# Patient Record
Sex: FEMALE | Race: WHITE | NOT HISPANIC OR LATINO | Employment: OTHER | URBAN - METROPOLITAN AREA
[De-identification: names, ages, dates, MRNs, and addresses within clinical notes are randomized per-mention and may not be internally consistent; named-entity substitution may affect disease eponyms.]

---

## 2017-01-06 ENCOUNTER — TELEPHONE (OUTPATIENT)
Dept: AUDIOLOGY | Facility: CLINIC | Age: 67
End: 2017-01-06

## 2017-01-06 ENCOUNTER — CLINICAL SUPPORT (OUTPATIENT)
Dept: AUDIOLOGY | Facility: CLINIC | Age: 67
End: 2017-01-06
Payer: MEDICARE

## 2017-01-06 DIAGNOSIS — H90.3 HEARING LOSS, SENSORINEURAL, ASYMMETRICAL: Primary | ICD-10-CM

## 2017-01-06 NOTE — PROGRESS NOTES
Luci Farias was seen 01/06/2017 for a hearing aid consult to discuss hearing aids.     The following aids will be ordered:    An [employer] OPN 1 mini RITEs  Color: Lyle brown  Speaker Units:  1 R 85  Domes:  8mm bass single?      She is already scheduled to return for her fitting.

## 2017-01-06 NOTE — MR AVS SNAPSHOT
[facility]  [address] [place] [address]  Phone: [phone]  Fax: [phone]                  Laurennaeemsherri Farias   2017 8:00 AM   Appointment    Description:  Female : 1950   Provider:  [facility]   Department:  [facility]                To Do List           Future Appointments        Provider Department Dept Phone    2017 9:00 AM [facility] [phone]      Goals (5 Years of Data)     None      [facility]     [facility] -  Assistance  Registered nurses in the [facility] provide clinical advisement, health education, appointment booking, and other advisory services.  Call for this free service at [phone].             Medications           Message regarding Medications     Verify the changes and/or additions to your medication regime listed below are the same as discussed with your clinician today.  If any of these changes or additions are incorrect, please notify your healthcare provider.             Verify that the below list of medications is an accurate representation of the medications you are currently taking.  If none reported, the list may be blank. If incorrect, please contact your healthcare provider. Carry this list with you in case of emergency.           Current Medications     amlodipine (NORVASC) 5 MG tablet Take 5 mg by mouth once daily.    aspirin 81 MG Chew Take 81 mg by mouth once daily.    b complex vitamins capsule Take 1 capsule by mouth once daily.    benzonatate (TESSALON) 100 MG capsule Take 100 mg by mouth 3 (three) times daily as needed for Cough.    bumetanide (BUMEX) 2 MG tablet Take 2 mg by mouth once daily.    calcium-vitamin D3 (CALCIUM 500 + D) 500 mg(1,250mg) -200 unit per tablet Take 1 tablet by mouth 2 (two) times daily with meals.    carvedilol (COREG) 12.5 MG tablet Take 12.5 mg by mouth 2 (two) times daily with meals.     clopidogrel (PLAVIX) 75 mg tablet Take 75 mg by mouth once daily.    epoetin jose (PROCRIT) 3,000 unit/mL injection Inject 3,000 Units into the skin once.    ferrous sulfate 324 mg (65 mg iron) TbEC Take 325 mg by mouth once daily.    fluticasone (FLONASE) 50 mcg/actuation nasal spray 1 spray by Each Nare route once daily.    gabapentin (NEURONTIN) 300 MG capsule Take 300 mg by mouth 3 (three) times daily.    insulin glargine (LANTUS) 100 unit/mL injection Inject 12 Units into the skin every evening.    isosorbide-hydrALAZINE 20-37.5 mg (BIDIL) 20-37.5 mg Tab Take 1 tablet by mouth 3 (three) times daily.    lactulose (CEPHULAC) 10 gram packet Take 10 g by mouth 3 (three) times daily.    levothyroxine (SYNTHROID, LEVOTHROID) 175 MCG tablet Take 175 mcg by mouth once daily.    metOLazone (ZAROXOLYN) 5 MG tablet Take 5 mg by mouth once daily.    pseudoephedrine-guaifenesin  mg (MUCINEX D)  mg per tablet Take 2 tablets by mouth every 12 (twelve) hours.    rosuvastatin (CRESTOR) 20 MG tablet Take 20 mg by mouth once daily.    spironolactone (ALDACTONE) 25 MG tablet Take 25 mg by mouth once daily.           Clinical Reference Information           Allergies as of 1/6/2017     Codeine    Darvon [Propoxyphene]    Demerol [Meperidine]    Penicillins      Immunizations Administered on Date of Encounter - 1/6/2017     None      [facility] Sign-Up     Activating your [facility] account is as easy as 1-2-3!     1) Visit [email or web address], select Sign Up Now, enter this activation code and your date of birth, then select Next.  [email or web address]  Expires: 1/20/2017  1:10 PM      2) Create a username and password to use when you visit [facility] in the future and select a security question in case you lose your password and select Next.    3) Enter your e-mail address and click Sign Up!    Additional Information  If you have questions, please e-mail [email or web address] or call [phone] to talk to our [facility] staff.  Remember, [facility] is NOT to be used for urgent needs. For medical emergencies, dial 911.

## 2017-01-06 NOTE — TELEPHONE ENCOUNTER
----- Message from Tremaine Wood sent at 1/6/2017 10:07 AM CST -----  Contact: Radha ( [facility] )   Radha GODOY  ( [facility] ) is requesting a call from nurse to requesting last office notes of the pt visit. Radha ( [facility] ) states she believes the pt daughter doesn't want anyone to know the pt is in a nursing home but the nurse will need to get document and notes.         Please call  Radha Gutierrez ) back at [phone] or [phone]

## 2017-01-26 ENCOUNTER — CLINICAL SUPPORT (OUTPATIENT)
Dept: AUDIOLOGY | Facility: CLINIC | Age: 67
End: 2017-01-26
Payer: MEDICARE

## 2017-01-26 DIAGNOSIS — H90.3 HEARING LOSS, SENSORINEURAL, ASYMMETRICAL: Primary | ICD-10-CM

## 2017-01-26 NOTE — PROGRESS NOTES
Luci Farias was seen on 01/26/2017 for a hearing aid fitting.  Settings were set to prescribed level 2 based on current audiogram.  Patient was counseled on insertion, cleaning and maintenance. Patient was given a copy of the hearing aid purchase agreement and she already paid in full on 1/6/17.  Patient's one month trail period will begin today. Patient has been scheduled for a hearing aid follow up in two weeks.      :  [employer]  Model:  OPN 1 mini rite  Color:terracotta  Speaker link:1 (61)  Right aid sn#:[id number]  Left aid sn#:na  Dome size:6 mm bass double   Micromold sn#:na  Repair warranty;2/9/20  L/D warranty:2/9/20

## 2017-05-11 ENCOUNTER — CLINICAL SUPPORT (OUTPATIENT)
Dept: AUDIOLOGY | Facility: CLINIC | Age: 67
End: 2017-05-11
Payer: MEDICARE

## 2017-05-11 DIAGNOSIS — H90.3 HEARING LOSS, SENSORINEURAL, ASYMMETRICAL: Primary | ICD-10-CM

## 2017-05-11 NOTE — PROGRESS NOTES
Luci Farias was seen 05/11/2017 for a hearing aid follow-up appointment.  Aid dead, wax filter replaced and aid is now in good working condition. Aid put to step 3.  She was otherwise doing very well with her hearing aid.      Patient will call for any future hearing aid follow-up appointments as needed.

## 2017-06-12 ENCOUNTER — TELEPHONE (OUTPATIENT)
Dept: OTOLARYNGOLOGY | Facility: CLINIC | Age: 67
End: 2017-06-12

## 2017-06-12 NOTE — TELEPHONE ENCOUNTER
----- Message from Cecy Díaz sent at 6/12/2017  8:27 AM CDT -----  Contact: pt   Call pt regarding questions about hearing aids.   ..[phone] (fduz)

## 2017-06-27 ENCOUNTER — CLINICAL SUPPORT (OUTPATIENT)
Dept: AUDIOLOGY | Facility: CLINIC | Age: 67
End: 2017-06-27
Payer: MEDICARE

## 2017-06-27 ENCOUNTER — OFFICE VISIT (OUTPATIENT)
Dept: OTOLARYNGOLOGY | Facility: CLINIC | Age: 67
End: 2017-06-27
Payer: MEDICARE

## 2017-06-27 VITALS
TEMPERATURE: 94 F | HEART RATE: 60 BPM | SYSTOLIC BLOOD PRESSURE: 113 MMHG | DIASTOLIC BLOOD PRESSURE: 76 MMHG | WEIGHT: 156.06 LBS

## 2017-06-27 DIAGNOSIS — Z46.1 ENCOUNTER FOR FITTING AND ADJUSTMENT OF HEARING AID OF BOTH EARS: Primary | ICD-10-CM

## 2017-06-27 DIAGNOSIS — S01.311A LACERATION OF EAR CANAL, RIGHT, INITIAL ENCOUNTER: ICD-10-CM

## 2017-06-27 DIAGNOSIS — H90.3 SENSORINEURAL HEARING LOSS (SNHL) OF BOTH EARS: Primary | ICD-10-CM

## 2017-06-27 PROCEDURE — 1159F MED LIST DOCD IN RCRD: CPT | Mod: S$GLB,,, | Performed by: ORTHOPAEDIC SURGERY

## 2017-06-27 PROCEDURE — 99999 PR PBB SHADOW E&M-EST. PATIENT-LVL III: CPT | Mod: PBBFAC,,, | Performed by: ORTHOPAEDIC SURGERY

## 2017-06-27 PROCEDURE — 99213 OFFICE O/P EST LOW 20 MIN: CPT | Mod: S$GLB,,, | Performed by: ORTHOPAEDIC SURGERY

## 2017-06-27 PROCEDURE — 1125F AMNT PAIN NOTED PAIN PRSNT: CPT | Mod: S$GLB,,, | Performed by: ORTHOPAEDIC SURGERY

## 2017-06-27 RX ORDER — OFLOXACIN 3 MG/ML
3 SOLUTION AURICULAR (OTIC) 2 TIMES DAILY
Qty: 5 ML | Refills: 0 | Status: SHIPPED | OUTPATIENT
Start: 2017-06-27 | End: 2017-06-27 | Stop reason: SDUPTHER

## 2017-06-27 RX ORDER — OFLOXACIN 3 MG/ML
3 SOLUTION AURICULAR (OTIC) 2 TIMES DAILY
Qty: 5 ML | Refills: 0 | OUTPATIENT
Start: 2017-06-27 | End: 2017-06-27 | Stop reason: SDUPTHER

## 2017-06-27 RX ORDER — OFLOXACIN 3 MG/ML
3 SOLUTION AURICULAR (OTIC) 2 TIMES DAILY
Qty: 5 ML | Refills: 0 | Status: SHIPPED | OUTPATIENT
Start: 2017-06-27 | End: 2017-07-04

## 2017-06-27 NOTE — PROGRESS NOTES
Subjective:       Patient ID: Luci Farias is a 66 y.o. female.    Chief Complaint: Otalgia (right / bleeding)    Patient is a very pleasant 66 year old female here to see me today for evaluation of recent bleeding from her right ear.  She wears hearing aids, and yesterday noted some bleeding from her right ear.  She also had some associated pain in her right ear.  She saw a blood clot on her Qtip as well as on a tissue.  She has not worn her hearing aids since that time.  She is on blood thinners, ASA and Plavix.  She has recently had some adjustment with her hearing aids, but they did not change or adjust the domes.      Review of Systems   Constitutional: Negative for chills, fatigue, fever and unexpected weight change.   HENT: Positive for congestion, hearing loss and tinnitus. Negative for dental problem, ear discharge, ear pain, facial swelling, nosebleeds, postnasal drip, rhinorrhea, sinus pressure, sneezing, sore throat, trouble swallowing and voice change.    Eyes: Negative for redness, itching and visual disturbance.   Respiratory: Positive for shortness of breath. Negative for cough, choking and wheezing.    Cardiovascular: Positive for chest pain. Negative for palpitations.   Gastrointestinal: Negative for abdominal pain.        No reflux.   Musculoskeletal: Negative for gait problem.   Skin: Negative for rash.   Neurological: Negative for dizziness, light-headedness and headaches.       Objective:      Physical Exam   Constitutional: She is oriented to person, place, and time. She appears well-developed and well-nourished. No distress.   HENT:   Head: Normocephalic and atraumatic.   Right Ear: Tympanic membrane, external ear and ear canal normal. Decreased hearing is noted.   Left Ear: Tympanic membrane, external ear and ear canal normal. Decreased hearing is noted.   Nose: Nose normal. No mucosal edema, rhinorrhea, nasal deformity or septal deviation. No epistaxis. Right sinus exhibits no  maxillary sinus tenderness and no frontal sinus tenderness. Left sinus exhibits no maxillary sinus tenderness and no frontal sinus tenderness.   Mouth/Throat: Uvula is midline, oropharynx is clear and moist and mucous membranes are normal. Mucous membranes are not pale and not dry. No dental caries. No oropharyngeal exudate or posterior oropharyngeal erythema.   Bloody otorrhea and clot in right EAC, suctioned with the otomicroscope, small abrasion at the inferior medial EAC, no longer actively bleeding   Eyes: Conjunctivae, EOM and lids are normal. Pupils are equal, round, and reactive to light. Right eye exhibits no chemosis. Left eye exhibits no chemosis. Right conjunctiva is not injected. Left conjunctiva is not injected. No scleral icterus. Right eye exhibits normal extraocular motion and no nystagmus. Left eye exhibits normal extraocular motion and no nystagmus.   Neck: Trachea normal and phonation normal. No tracheal tenderness present. No tracheal deviation present. No thyroid mass and no thyromegaly present.   Cardiovascular: Intact distal pulses.    Pulmonary/Chest: Effort normal. No stridor. No respiratory distress.   Abdominal: She exhibits no distension.   Lymphadenopathy:        Head (right side): No submental, no submandibular, no preauricular, no posterior auricular and no occipital adenopathy present.        Head (left side): No submental, no submandibular, no preauricular, no posterior auricular and no occipital adenopathy present.     She has no cervical adenopathy.   Neurological: She is alert and oriented to person, place, and time. No cranial nerve deficit.   Skin: Skin is warm and dry. No rash noted. No erythema.   Psychiatric: She has a normal mood and affect. Her behavior is normal.       Assessment:       1. Sensorineural hearing loss (SNHL) of both ears    2. Laceration of ear canal, right, initial encounter        Plan:       1.  SNHL:  Do not wear hearing aids for the next week, OK to  resume use after one week.  2.  Laceration of ear canal:  Cleaned today in clinic. Discussed that she is on anticoagulation therapy (ASA and Plavix), and that has likely contributed to her persistent bleeding from the right ear.  I would recommend Floxin twice daily for one week to her right ear, prescription sent to her pharmacy.  She can also use Afrin as needed for active bleeding.

## 2017-06-27 NOTE — PROGRESS NOTES
Cleaned hearing aid that was exposed to bloody ear canal. Ms. Farias will keep ENT appointment today due to blood in EAC.  Could not visualize TM.  Repaired hearing aid to iphone and set up ernesto.  Pt satisfied.  Seeing Dr. Alvarado momentarily.  Will rtc as needed.

## 2017-06-28 ENCOUNTER — TELEPHONE (OUTPATIENT)
Dept: OTOLARYNGOLOGY | Facility: CLINIC | Age: 67
End: 2017-06-28

## 2017-06-28 NOTE — TELEPHONE ENCOUNTER
Spoke with Kailee at [facility].  She states you prescribed ear drops with no end date.  Needs to know how long you want . Dinora on the drops.  Also on the bottom of paperwork you wrote for patient to use Afrin.  They an actual written order for this with your signature.  Needs directions and how long she is to use the Afrin.  I told her I would send the message to you and we would call her back with a response.  Please advise.  Thanks.

## 2017-06-28 NOTE — TELEPHONE ENCOUNTER
----- Message from Kemi Garcia sent at 6/28/2017 11:43 AM CDT -----  Contact: judith ward-[phone]  Would like to consult with nurse regarding providing an end date for the order recieved. Also she has questions regarding spray for the patient. Please call back at [phone].        Thanks,  Kemi Garcia

## 2017-06-29 NOTE — TELEPHONE ENCOUNTER
Dr. Alvarado is not in today.  I spoke with nurse Dugan and explained that on yesterday that Dr. Alvarado responded saying Floxin for 7 days but she did not write and sign a script for Afrin.  I explained that this is what I was waiting on.  Told her that Dr. Alvarado is not in today but that I could check to see if our PA, Mrs. Shabnam Pratt could take care of this for us.  She stated that would be fine and to just fax the scripts to her.

## 2017-06-29 NOTE — TELEPHONE ENCOUNTER
"I conveyed the information belowto nurse Dugan.  I explained that we would have to wait for Dr. Alvarado for further instructions other than Afrin when actively bleeding up to four times a day.  Nurse Dugan states that she never got these orders and if this is written, it should be fine.  I told her that I faxed it at 12:45 and I would refax it with a copy of my 12:45 confirmation sheet.  She verbalized understanding.  I faxed script again.    I scanned the updated script into her chart also under "medication history"  "

## 2017-06-29 NOTE — TELEPHONE ENCOUNTER
----- Message from Marisol Alex sent at 6/29/2017 10:32 AM CDT -----  Contact: Kailee/Magan Dugan called to speak with the nurse; she needs a stop date for an order and she needs an order for Afrin.     Fax number [phone]. She can be contacted at [phone].    Thanks,  Marisol

## 2017-06-29 NOTE — TELEPHONE ENCOUNTER
I spoke with Shabnam and she physically wrote a script which I faxed to nurse Kailee and I scanned into her chart here.

## 2017-06-29 NOTE — TELEPHONE ENCOUNTER
Shabnam, do you want to continue to try to do this or would you like me to wait for Dr. Alvarado.

## 2017-09-08 ENCOUNTER — CLINICAL SUPPORT (OUTPATIENT)
Dept: AUDIOLOGY | Facility: CLINIC | Age: 67
End: 2017-09-08
Payer: MEDICARE

## 2017-09-08 DIAGNOSIS — Z46.1 HEARING AID FITTING OR ADJUSTMENT: Primary | ICD-10-CM

## 2017-09-08 NOTE — PROGRESS NOTES
Luci Farias was seen 09/08/2017 for a hearing aid follow-up appointment.  Hearing aid checked and it was found to be in good working order. IPhone checked as well.  Adjustments were made for speech in noise.  Noise reduction was increased for simple and complex environments. Sound perception was adjusted too.  She will try these changes over the next few weeks and let me know.    I mentioned to her about the new rechargeable option and she will consider this.      Patient will call for any future hearing aid follow-up appointments as needed.

## 2017-11-10 ENCOUNTER — CLINICAL SUPPORT (OUTPATIENT)
Dept: AUDIOLOGY | Facility: CLINIC | Age: 67
End: 2017-11-10
Payer: MEDICARE

## 2017-11-10 DIAGNOSIS — Z46.1 HEARING AID FITTING OR ADJUSTMENT: Primary | ICD-10-CM

## 2017-11-10 NOTE — PROGRESS NOTES
Luci Farias was seen 11/10/2017 for a hearing aid follow-up appointment.  Aid is in good working order. Her Iphone became un-paired.  I re-paired it today.  It is now working properly. Also, she reports that when she puts her finger in her ear that speech seems louder and clearer. She will try an 8 mm bass double dome.       Patient will call for any future hearing aid follow-up appointments as needed.

## 2018-01-16 ENCOUNTER — TELEPHONE (OUTPATIENT)
Dept: OTOLARYNGOLOGY | Facility: CLINIC | Age: 68
End: 2018-01-16

## 2018-01-16 NOTE — TELEPHONE ENCOUNTER
----- Message from Jocelyne Mcgovern sent at 1/16/2018 10:59 AM CST -----  Contact: Suni Roca  Calling to reschedule pt appointment for 01/23/18 and want to be worked into the schedule and please advise [phone]

## 2018-01-16 NOTE — TELEPHONE ENCOUNTER
Spoke with Suni from nursing home facility where patient resides and rescheduled appt to 01/31. Patient missed appt on today due to a flu breakout. Nurse verbalized understanding of new appt date and time.

## 2018-01-31 ENCOUNTER — OFFICE VISIT (OUTPATIENT)
Dept: OTOLARYNGOLOGY | Facility: CLINIC | Age: 68
End: 2018-01-31
Payer: MEDICARE

## 2018-01-31 ENCOUNTER — CLINICAL SUPPORT (OUTPATIENT)
Dept: AUDIOLOGY | Facility: CLINIC | Age: 68
End: 2018-01-31
Payer: MEDICARE

## 2018-01-31 ENCOUNTER — TELEPHONE (OUTPATIENT)
Dept: OTOLARYNGOLOGY | Facility: CLINIC | Age: 68
End: 2018-01-31

## 2018-01-31 VITALS — HEART RATE: 69 BPM | SYSTOLIC BLOOD PRESSURE: 160 MMHG | DIASTOLIC BLOOD PRESSURE: 90 MMHG | TEMPERATURE: 98 F

## 2018-01-31 DIAGNOSIS — H69.91 DYSFUNCTION OF RIGHT EUSTACHIAN TUBE: Primary | ICD-10-CM

## 2018-01-31 DIAGNOSIS — H90.3 SENSORINEURAL HEARING LOSS (SNHL) OF BOTH EARS: Primary | ICD-10-CM

## 2018-01-31 DIAGNOSIS — H69.91 ETD (EUSTACHIAN TUBE DYSFUNCTION), RIGHT: ICD-10-CM

## 2018-01-31 DIAGNOSIS — H65.191 ACUTE MEE (MIDDLE EAR EFFUSION), RIGHT: ICD-10-CM

## 2018-01-31 PROCEDURE — 92567 TYMPANOMETRY: CPT | Mod: S$GLB,,, | Performed by: AUDIOLOGIST

## 2018-01-31 PROCEDURE — 1159F MED LIST DOCD IN RCRD: CPT | Mod: S$GLB,,, | Performed by: ORTHOPAEDIC SURGERY

## 2018-01-31 PROCEDURE — 99999 PR PBB SHADOW E&M-EST. PATIENT-LVL II: CPT | Mod: PBBFAC,,, | Performed by: ORTHOPAEDIC SURGERY

## 2018-01-31 PROCEDURE — 99213 OFFICE O/P EST LOW 20 MIN: CPT | Mod: S$GLB,,, | Performed by: ORTHOPAEDIC SURGERY

## 2018-01-31 PROCEDURE — 92557 COMPREHENSIVE HEARING TEST: CPT | Mod: S$GLB,,, | Performed by: AUDIOLOGIST

## 2018-01-31 PROCEDURE — 1126F AMNT PAIN NOTED NONE PRSNT: CPT | Mod: S$GLB,,, | Performed by: ORTHOPAEDIC SURGERY

## 2018-01-31 RX ORDER — GABAPENTIN 100 MG/1
100 CAPSULE ORAL 3 TIMES DAILY
COMMUNITY

## 2018-01-31 RX ORDER — ERGOCALCIFEROL 1.25 MG/1
50000 CAPSULE ORAL
COMMUNITY

## 2018-01-31 RX ORDER — LEVOFLOXACIN 250 MG/1
250 TABLET ORAL DAILY
COMMUNITY
End: 2018-04-04

## 2018-01-31 RX ORDER — GUAIFENESIN 400 MG/1
400 TABLET ORAL 2 TIMES DAILY
COMMUNITY
End: 2018-04-04

## 2018-01-31 RX ORDER — DOCUSATE CALCIUM 240 MG
240 CAPSULE ORAL EVERY MORNING
COMMUNITY

## 2018-01-31 RX ORDER — ACETAMINOPHEN 325 MG/1
650 TABLET ORAL EVERY 4 HOURS PRN
COMMUNITY

## 2018-01-31 RX ORDER — BEVACIZUMAB 2.5 MG/0.1
SYRINGE (ML) INTRAOCULAR
COMMUNITY

## 2018-01-31 RX ORDER — LORATADINE 10 MG/1
10 TABLET ORAL EVERY MORNING
COMMUNITY

## 2018-01-31 RX ORDER — INSULIN ASPART 100 [IU]/ML
INJECTION, SOLUTION INTRAVENOUS; SUBCUTANEOUS
COMMUNITY

## 2018-01-31 RX ORDER — LISINOPRIL 40 MG/1
40 TABLET ORAL DAILY
COMMUNITY
End: 2018-04-04

## 2018-01-31 RX ORDER — POLYVINYL ALCOHOL 14 MG/ML
1 SOLUTION/ DROPS OPHTHALMIC
COMMUNITY
End: 2018-04-04

## 2018-01-31 RX ORDER — IPRATROPIUM BROMIDE AND ALBUTEROL SULFATE 2.5; .5 MG/3ML; MG/3ML
3 SOLUTION RESPIRATORY (INHALATION) EVERY 6 HOURS PRN
COMMUNITY
End: 2018-04-04

## 2018-01-31 RX ORDER — PROMETHAZINE HYDROCHLORIDE AND DEXTROMETHORPHAN HYDROBROMIDE 6.25; 15 MG/5ML; MG/5ML
5 SYRUP ORAL EVERY 6 HOURS PRN
COMMUNITY
End: 2018-04-04

## 2018-01-31 NOTE — TELEPHONE ENCOUNTER
Spoke with patients caretaker, Helene. Informed Helene that patient left after visit and forgot paperwork in clinic. Faxed over note and after visit summary to facility where patient resides, Attn: Helene.

## 2018-01-31 NOTE — PROGRESS NOTES
Subjective:      Patient ID: Luci Farias is a 67 y.o. female.    Chief Complaint: Hearing aid dome in right ear    Patient is a very pleasant 67 year old female here to see me today for evaluation of her right ear.  She has known hearing loss and wears hearing aids.  She said that she is concerned that one of the domes came off in her ear 3-4 weeks ago.  During that time, she was also diagnosed with the flu and pneumonia and has also been told she has fluid in her ear.  She has some slight ear pain, but no ear drainage.  She does report a popping sensation in her right ear at times, and also feels that her hearing is more muffled.  She is to see audiology today as well.  She is currently using Flonase twice daily.          Review of Systems   Constitutional: Positive for fatigue and unexpected weight change. Negative for chills and fever.   HENT: Positive for congestion, ear pain, hearing loss and rhinorrhea. Negative for dental problem, ear discharge, facial swelling, nosebleeds, postnasal drip, sinus pressure, sneezing, sore throat, tinnitus, trouble swallowing and voice change.    Eyes: Negative for redness, itching and visual disturbance.   Respiratory: Positive for shortness of breath. Negative for cough, choking and wheezing.    Cardiovascular: Positive for chest pain. Negative for palpitations.   Gastrointestinal: Positive for abdominal pain.        No reflux.   Musculoskeletal: Negative for gait problem.   Skin: Negative for rash.   Neurological: Positive for light-headedness and headaches. Negative for dizziness.       Objective:       Physical Exam   Constitutional: She is oriented to person, place, and time. She appears well-developed and well-nourished. No distress.   HENT:   Head: Normocephalic and atraumatic.   Right Ear: External ear and ear canal normal. A middle ear effusion (able to see clear air fluid level) is present. Decreased hearing is noted.   Left Ear: Tympanic membrane, external ear  and ear canal normal. Decreased hearing is noted.   Nose: Nose normal. No mucosal edema, rhinorrhea, nasal deformity or septal deviation. No epistaxis. Right sinus exhibits no maxillary sinus tenderness and no frontal sinus tenderness. Left sinus exhibits no maxillary sinus tenderness and no frontal sinus tenderness.   Mouth/Throat: Uvula is midline, oropharynx is clear and moist and mucous membranes are normal. Mucous membranes are not pale and not dry. No dental caries. No oropharyngeal exudate or posterior oropharyngeal erythema.   Eyes: Conjunctivae, EOM and lids are normal. Pupils are equal, round, and reactive to light. Right eye exhibits no chemosis. Left eye exhibits no chemosis. Right conjunctiva is not injected. Left conjunctiva is not injected. No scleral icterus. Right eye exhibits normal extraocular motion and no nystagmus. Left eye exhibits normal extraocular motion and no nystagmus.   Neck: Trachea normal and phonation normal. No tracheal tenderness present. No tracheal deviation present. No thyroid mass and no thyromegaly present.   Cardiovascular: Intact distal pulses.    Pulmonary/Chest: Effort normal. No stridor. No respiratory distress.   Abdominal: She exhibits no distension.   Lymphadenopathy:        Head (right side): No submental, no submandibular, no preauricular, no posterior auricular and no occipital adenopathy present.        Head (left side): No submental, no submandibular, no preauricular, no posterior auricular and no occipital adenopathy present.     She has no cervical adenopathy.   Neurological: She is alert and oriented to person, place, and time. No cranial nerve deficit.   Skin: Skin is warm and dry. No rash noted. No erythema.   Psychiatric: She has a normal mood and affect. Her behavior is normal.       Assessment:       1. Sensorineural hearing loss (SNHL) of both ears    2. Asymmetrical left sensorineural hearing loss    3. Acute MATTIE (middle ear effusion), right    4. ETD  (Eustachian tube dysfunction), right        Plan:     Sensorineural hearing loss (SNHL) of both ears:  She is to follow with audiology today for audiogram and hearing aid adjustment, will review when complete.    Asymmetrical left sensorineural hearing loss    Acute MATTIE (middle ear effusion), right:  On exam, the fluid is clear and has numerous bubbles.  I would recommend she continue with her Flonase twice daily, likely a result of her recent illness (flu and pneumonia).   I would anticipate that it continues to improve over the coming weeks, as she has already seen some improvement over the last few days.  If fluid persists in 4-6 weeks, then would consider placement of an ear tube.    ETD (Eustachian tube dysfunction), right:  Flonase twice daily as above.

## 2018-01-31 NOTE — PROGRESS NOTES
Luci Farias was seen 01/31/2018 for an audiological evaluation.  Patient complains of having the flu 3 weeks ago and having fluid in both ears.  Fluid has resolved in the left ear, she reports.  She is complaining of popping and decreased hearing on the right side. She is also worried about a possible dome stuck in her right ear.    Dr. Alvarado saw patient prior to testing and confirmed fluid behind right TM and no dome in right EAC.    Results reveal a mild-to-moderately severe mixed hearing loss 250-8000 Hz for the right ear, and  severe sensorineural hearing loss 250-8000 Hz for the left ear.   Speech Reception Thresholds were  30 dBHL for the right ear and 85 dBHL for the left ear.   Word recognition scores were excellent for the right ear and no response for the left ear.   Tympanograms were Type B, abnormal, flat for the right ear and Type A, normal for the left ear.    Patient was counseled on the above findings.    Recommendations include:    1.  ENT followup in 6 weeks if fluid still persists/nasonex twice daily  2.  Hearing aid follow-up today  3.  Wear hearing protective devices around loud noise  4.  Annual audiograms

## 2018-01-31 NOTE — TELEPHONE ENCOUNTER
----- Message from Amy Rendon sent at 1/31/2018  3:16 PM CST -----  Contact: Kailee @ [facility]  Calling concerning any changes and/or orders on patient seen today. States the patient came back with nothing.  Please call Helene @ [phone]. Thanks, paty

## 2018-02-05 ENCOUNTER — TELEPHONE (OUTPATIENT)
Dept: OTOLARYNGOLOGY | Facility: CLINIC | Age: 68
End: 2018-02-05

## 2018-02-05 NOTE — TELEPHONE ENCOUNTER
"FERNYI.  Please sign that this is what you conveyed to me.  Thanks.    I called and spoke with Kailee.  She states the patient is telling her that Dr. Alvarado ordered at chest xray and an oral steroid.  I checked with Dr. Alvarado who states she did not order the chest xray or oral steroids.  She only told the patient to continue the use of Flonase twice a day and follow up in 4 to 6 weeks for possible tube if fluid persist.  I conveyed this to nurse, Kailee, who verbalized understanding.    Received a "Remind me" message from [facility] on Friday, 2/2/18, letting us know that Kailee or Helene from the nursing home ([phone]) is needing to speak with concerning Mrs. Farias.  "

## 2018-04-04 ENCOUNTER — CLINICAL SUPPORT (OUTPATIENT)
Dept: AUDIOLOGY | Facility: CLINIC | Age: 68
End: 2018-04-04
Payer: MEDICARE

## 2018-04-04 ENCOUNTER — OFFICE VISIT (OUTPATIENT)
Dept: OTOLARYNGOLOGY | Facility: CLINIC | Age: 68
End: 2018-04-04
Payer: MEDICARE

## 2018-04-04 VITALS
DIASTOLIC BLOOD PRESSURE: 68 MMHG | TEMPERATURE: 98 F | WEIGHT: 161.38 LBS | HEART RATE: 69 BPM | SYSTOLIC BLOOD PRESSURE: 110 MMHG

## 2018-04-04 DIAGNOSIS — H90.3 SENSORINEURAL HEARING LOSS (SNHL) OF BOTH EARS: Primary | ICD-10-CM

## 2018-04-04 DIAGNOSIS — H90.3 HEARING LOSS, SENSORINEURAL, ASYMMETRICAL: Primary | ICD-10-CM

## 2018-04-04 DIAGNOSIS — H69.91 ETD (EUSTACHIAN TUBE DYSFUNCTION), RIGHT: ICD-10-CM

## 2018-04-04 PROCEDURE — 92567 TYMPANOMETRY: CPT | Mod: S$GLB,,, | Performed by: AUDIOLOGIST

## 2018-04-04 PROCEDURE — 99999 PR PBB SHADOW E&M-EST. PATIENT-LVL II: CPT | Mod: PBBFAC,,, | Performed by: ORTHOPAEDIC SURGERY

## 2018-04-04 PROCEDURE — 99213 OFFICE O/P EST LOW 20 MIN: CPT | Mod: S$GLB,,, | Performed by: ORTHOPAEDIC SURGERY

## 2018-04-04 RX ORDER — AMLODIPINE BESYLATE 10 MG/1
10 TABLET ORAL DAILY
COMMUNITY
End: 2019-01-03 | Stop reason: ALTCHOICE

## 2018-04-04 RX ORDER — VALSARTAN 320 MG/1
320 TABLET ORAL EVERY MORNING
COMMUNITY

## 2018-04-04 RX ORDER — TRAMADOL HYDROCHLORIDE 50 MG/1
50 TABLET ORAL EVERY 8 HOURS PRN
COMMUNITY

## 2018-04-04 RX ORDER — CARVEDILOL 25 MG/1
25 TABLET ORAL 2 TIMES DAILY
COMMUNITY
End: 2019-01-03 | Stop reason: ALTCHOICE

## 2018-04-04 RX ORDER — POLYETHYLENE GLYCOL 3350 17 G/17G
POWDER, FOR SOLUTION ORAL EVERY MORNING
COMMUNITY

## 2018-04-04 RX ORDER — RANOLAZINE 500 MG/1
500 TABLET, EXTENDED RELEASE ORAL 2 TIMES DAILY
COMMUNITY

## 2018-04-04 RX ORDER — HYDRALAZINE HYDROCHLORIDE 50 MG/1
50 TABLET, FILM COATED ORAL 3 TIMES DAILY
COMMUNITY
End: 2019-01-03 | Stop reason: ALTCHOICE

## 2018-04-04 RX ORDER — GABAPENTIN 100 MG/1
100 CAPSULE ORAL 3 TIMES DAILY
COMMUNITY
End: 2019-01-03 | Stop reason: SDUPTHER

## 2018-04-04 RX ORDER — METOLAZONE 2.5 MG/1
2.5 TABLET ORAL
COMMUNITY

## 2018-04-04 RX ORDER — TRAZODONE HYDROCHLORIDE 50 MG/1
50 TABLET ORAL NIGHTLY
COMMUNITY

## 2018-04-04 NOTE — PROGRESS NOTES
Luci Farias was seen 04/04/2018 for an audiological evaluation.  Patient here for re-check of RIGHT fluid in January.  She reports that the fluid has resolved.    Tympanometry revealed Type A, normal in the right ear, and Type A, normal in the left ear.   Right Ear:  0.8ml@ 0daPa, with ear canal volume of:1.3  Left Ear:  0.6ml@-80 daPa, with ear canal volume of: 1.4    Patient was counseled with results.

## 2018-04-04 NOTE — PROGRESS NOTES
Subjective:      Patient ID: Luci Farias is a 67 y.o. female.    Chief Complaint: Follow-up (6 wk f/u)    Patient is a very pleasant 67 year old female here to see me today in followoup for evaluation of her right ear.  She has known hearing loss and wears hearing aids.  Prior to her last visit in January, she had been diagnosed with the flu and pneumonia and was told she had fluid in her ear.  She had some slight ear pain, but no ear drainage.  She did report a popping sensation in her right ear at times, and also felt that her hearing is more muffled.  She now feels that all of her symptoms have resolved.  She no longer has any ear pain, muffled hearing or popping in the right ear.  She is currently using Flonase twice daily.          Review of Systems   Constitutional: Positive for fatigue and unexpected weight change. Negative for chills and fever.   HENT: Positive for hearing loss and rhinorrhea. Negative for congestion, dental problem, ear discharge, ear pain, facial swelling, nosebleeds, postnasal drip, sinus pressure, sneezing, sore throat, tinnitus, trouble swallowing and voice change.    Eyes: Negative for redness, itching and visual disturbance.   Respiratory: Negative for cough, choking, shortness of breath and wheezing.    Cardiovascular: Negative for chest pain and palpitations.   Gastrointestinal: Negative for abdominal pain.        No reflux.   Musculoskeletal: Negative for gait problem.   Skin: Negative for rash.   Neurological: Positive for light-headedness (improved) and headaches. Negative for dizziness.       Objective:       Physical Exam   Constitutional: She is oriented to person, place, and time. She appears well-developed and well-nourished. No distress.   HENT:   Head: Normocephalic and atraumatic.   Right Ear: External ear and ear canal normal. No middle ear effusion. Decreased hearing is noted.   Left Ear: Tympanic membrane, external ear and ear canal normal.  No middle ear  effusion. Decreased hearing is noted.   Nose: Nose normal. No mucosal edema, rhinorrhea, nasal deformity or septal deviation. No epistaxis. Right sinus exhibits no maxillary sinus tenderness and no frontal sinus tenderness. Left sinus exhibits no maxillary sinus tenderness and no frontal sinus tenderness.   Mouth/Throat: Uvula is midline, oropharynx is clear and moist and mucous membranes are normal. Mucous membranes are not pale and not dry. No dental caries. No oropharyngeal exudate or posterior oropharyngeal erythema.   Has hearing aid in right ear   Eyes: Conjunctivae, EOM and lids are normal. Pupils are equal, round, and reactive to light. Right eye exhibits no chemosis. Left eye exhibits no chemosis. Right conjunctiva is not injected. Left conjunctiva is not injected. No scleral icterus. Right eye exhibits normal extraocular motion and no nystagmus. Left eye exhibits normal extraocular motion and no nystagmus.   Neck: Trachea normal and phonation normal. No tracheal tenderness present. No tracheal deviation present. No thyroid mass and no thyromegaly present.   Cardiovascular: Intact distal pulses.    Pulmonary/Chest: Effort normal. No stridor. No respiratory distress.   Abdominal: She exhibits no distension.   Lymphadenopathy:        Head (right side): No submental, no submandibular, no preauricular, no posterior auricular and no occipital adenopathy present.        Head (left side): No submental, no submandibular, no preauricular, no posterior auricular and no occipital adenopathy present.     She has no cervical adenopathy.   Neurological: She is alert and oriented to person, place, and time. No cranial nerve deficit.   Skin: Skin is warm and dry. No rash noted. No erythema.   Psychiatric: She has a normal mood and affect. Her behavior is normal.       TYMPANOGRAMS:  Tympanometry revealed Type A, normal in the right ear, and Type A, normal in the left ear.   Right Ear:  0.8ml@ 0daPa, with ear canal volume  of:1.3  Left Ear:  0.6ml@-80 daPa, with ear canal volume of: 1.4      Assessment:       1. Sensorineural hearing loss (SNHL) of both ears    2. Asymmetrical left sensorineural hearing loss    3. ETD (Eustachian tube dysfunction), right        Plan:     Sensorineural hearing loss (SNHL) of both ears:  Continue with hearing aid to right ear.    Asymmetrical left sensorineural hearing loss    Acute MATTIE (middle ear effusion), right:  Now resolved with no adverse sequelae.  Discussed that there is no need to consider tube placement at this point, call if symptoms recur.    ETD (Eustachian tube dysfunction), right:  Flonase once daily to help prevent recurrence of fluid.  Return to clinic as needed.

## 2018-08-02 ENCOUNTER — OFFICE VISIT (OUTPATIENT)
Dept: OTOLARYNGOLOGY | Facility: CLINIC | Age: 68
End: 2018-08-02
Payer: MEDICARE

## 2018-08-02 VITALS — WEIGHT: 157.88 LBS | TEMPERATURE: 97 F

## 2018-08-02 DIAGNOSIS — T16.1XXA FOREIGN BODY OF RIGHT EAR, INITIAL ENCOUNTER: Primary | ICD-10-CM

## 2018-08-02 PROCEDURE — 69200 CLEAR OUTER EAR CANAL: CPT | Mod: RT,S$GLB,, | Performed by: PHYSICIAN ASSISTANT

## 2018-08-02 PROCEDURE — 99499 UNLISTED E&M SERVICE: CPT | Mod: S$GLB,,, | Performed by: PHYSICIAN ASSISTANT

## 2018-08-02 PROCEDURE — 99999 PR PBB SHADOW E&M-EST. PATIENT-LVL V: CPT | Mod: PBBFAC,,, | Performed by: PHYSICIAN ASSISTANT

## 2018-08-02 RX ORDER — CIPROFLOXACIN AND DEXAMETHASONE 3; 1 MG/ML; MG/ML
4 SUSPENSION/ DROPS AURICULAR (OTIC) 2 TIMES DAILY
Qty: 7.5 ML | Refills: 0 | Status: SHIPPED | OUTPATIENT
Start: 2018-08-02 | End: 2018-08-12

## 2018-08-02 NOTE — PATIENT INSTRUCTIONS
Hearing aid dome removed from right ear.  Canal is very swollen and inflamed.  Some active bleeding noted.  OK to use Afrin nasal spray in the right ear if any additional active bleeding.  Recommend she leave that hearing aid out and keep that ear dry.  Start Ciprodex ear drops in her right ear BID for 10 days and then return for recheck in 2-3 weeks; call sooner with any worsening symptoms.

## 2018-08-02 NOTE — PROGRESS NOTES
Missing hearing aid dome in RIGHT ear on Sunday.  Worsening pain since that time.  No ear drainage and no fever.      PROCEDURE NOTE:  Removal of foreign body from RIGHT ear canal  Preprocedure diagnosis:  Foreign body ear canal  Postprocedure diangosis:  Same    Procedure in detail:  After verbal consent was obtained, the binocular operating microscope was used to visualize the foreign body and ear canal.  The object was carefully grasped using an alligator forcep as necessary.  It was removed from the ear canal without difficulty.  The canal had some edema and active bleeding along inferior EAC.  Tympanic membrane was injected but I do not see a perforation.  Topical Afrin applied in the ear.  The patient tolerated the procedure well, and there were no significant complications.      PLAN:  Dry Ear precautions and leave hearing aid out.  Topical Ciprodex BID and RTC in 7-10 days for recheck; sooner with any worsening symptoms.

## 2018-08-21 ENCOUNTER — OFFICE VISIT (OUTPATIENT)
Dept: OTOLARYNGOLOGY | Facility: CLINIC | Age: 68
End: 2018-08-21
Payer: MEDICARE

## 2018-08-21 VITALS
TEMPERATURE: 99 F | WEIGHT: 159.19 LBS | HEART RATE: 68 BPM | SYSTOLIC BLOOD PRESSURE: 138 MMHG | DIASTOLIC BLOOD PRESSURE: 67 MMHG

## 2018-08-21 DIAGNOSIS — H90.3 SENSORINEURAL HEARING LOSS (SNHL) OF BOTH EARS: ICD-10-CM

## 2018-08-21 DIAGNOSIS — H60.391 ACUTE INFECTIVE OTITIS EXTERNA, RIGHT: Primary | ICD-10-CM

## 2018-08-21 PROCEDURE — 99999 PR PBB SHADOW E&M-EST. PATIENT-LVL III: CPT | Mod: PBBFAC,,, | Performed by: ORTHOPAEDIC SURGERY

## 2018-08-21 PROCEDURE — 99213 OFFICE O/P EST LOW 20 MIN: CPT | Mod: S$GLB,,, | Performed by: ORTHOPAEDIC SURGERY

## 2018-08-21 RX ORDER — CIPROFLOXACIN AND DEXAMETHASONE 3; 1 MG/ML; MG/ML
4 SUSPENSION/ DROPS AURICULAR (OTIC) 2 TIMES DAILY
Qty: 7.5 ML | Refills: 0 | Status: SHIPPED | OUTPATIENT
Start: 2018-08-21 | End: 2018-08-31

## 2018-08-21 RX ORDER — SULFAMETHOXAZOLE AND TRIMETHOPRIM 800; 160 MG/1; MG/1
1 TABLET ORAL 2 TIMES DAILY
Qty: 20 TABLET | Refills: 0 | Status: SHIPPED | OUTPATIENT
Start: 2018-08-21 | End: 2018-08-31

## 2018-08-21 NOTE — PROGRESS NOTES
Subjective:       Patient ID: Luci Farias is a 67 y.o. female.    Chief Complaint: Follow-up (still has fluid drainage in right ear)    Patient is a very pleasant 67-year-old female here to see me today 2 weeks after foreign body removal from her right ear. She was last here approximately 2 weeks ago at which time she had a dome from her hearing aid in her right ear. She wears a hearing aid only in her right ear, as her left ears to poor for amplification.  She has completed her course of topical ear drops, however she continues to have significant pain as well as clear drainage from her right ear.  She has not been wearing her right hearing aid since her last visit.      Review of Systems   Constitutional: Negative for chills, fatigue, fever and unexpected weight change.   HENT: Positive for ear discharge, ear pain and hearing loss. Negative for congestion, dental problem, facial swelling, nosebleeds, postnasal drip, rhinorrhea, sinus pressure, sneezing, sore throat, tinnitus, trouble swallowing and voice change.    Eyes: Negative for redness, itching and visual disturbance.   Respiratory: Negative for cough, choking, shortness of breath and wheezing.    Cardiovascular: Negative for chest pain and palpitations.   Gastrointestinal: Negative for abdominal pain.        No reflux.   Musculoskeletal: Negative for gait problem.   Skin: Negative for rash.   Neurological: Negative for dizziness, light-headedness and headaches.       Objective:      Physical Exam   Constitutional: She is oriented to person, place, and time. She appears well-developed and well-nourished. No distress.   HENT:   Head: Normocephalic and atraumatic.   Right Ear: External ear and ear canal normal. There is drainage, swelling and tenderness. No middle ear effusion. Decreased hearing is noted.   Left Ear: Tympanic membrane, external ear and ear canal normal.  No middle ear effusion. Decreased hearing is noted.   Nose: Nose normal. No mucosal  edema, rhinorrhea, nasal deformity or septal deviation. No epistaxis. Right sinus exhibits no maxillary sinus tenderness and no frontal sinus tenderness. Left sinus exhibits no maxillary sinus tenderness and no frontal sinus tenderness.   Mouth/Throat: Uvula is midline, oropharynx is clear and moist and mucous membranes are normal. Mucous membranes are not pale and not dry. No dental caries. No oropharyngeal exudate or posterior oropharyngeal erythema.   Eyes: Conjunctivae, EOM and lids are normal. Pupils are equal, round, and reactive to light. Right eye exhibits no chemosis. Left eye exhibits no chemosis. Right conjunctiva is not injected. Left conjunctiva is not injected. No scleral icterus. Right eye exhibits normal extraocular motion and no nystagmus. Left eye exhibits normal extraocular motion and no nystagmus.   Neck: Trachea normal and phonation normal. No tracheal tenderness present. No tracheal deviation present. No thyroid mass and no thyromegaly present.   Cardiovascular: Intact distal pulses.   Pulmonary/Chest: Effort normal. No stridor. No respiratory distress.   Abdominal: She exhibits no distension.   Lymphadenopathy:        Head (right side): No submental, no submandibular, no preauricular, no posterior auricular and no occipital adenopathy present.        Head (left side): No submental, no submandibular, no preauricular, no posterior auricular and no occipital adenopathy present.     She has no cervical adenopathy.   Neurological: She is alert and oriented to person, place, and time. No cranial nerve deficit.   Skin: Skin is warm and dry. No rash noted. No erythema.   Psychiatric: She has a normal mood and affect. Her behavior is normal.       Assessment:       1. Acute infective otitis externa, right    2. Sensorineural hearing loss (SNHL) of both ears        Plan:       1.  Acute otitis externa right ear:  I would recommend she start back on Ciprodex ear drops, as well as Bactrim oral antibiotic  due to erythema and swelling of the conchal bowl and external auditory canal.  Return to clinic in 2 weeks for recheck.  2.  Sensorineural hearing loss:  Continue to keep the right hearing aid out until her ear is feeling better.

## 2018-09-04 ENCOUNTER — TELEPHONE (OUTPATIENT)
Dept: OTOLARYNGOLOGY | Facility: CLINIC | Age: 68
End: 2018-09-04

## 2018-09-04 ENCOUNTER — OFFICE VISIT (OUTPATIENT)
Dept: OTOLARYNGOLOGY | Facility: CLINIC | Age: 68
End: 2018-09-04
Payer: MEDICARE

## 2018-09-04 VITALS
HEART RATE: 68 BPM | WEIGHT: 154.31 LBS | DIASTOLIC BLOOD PRESSURE: 58 MMHG | TEMPERATURE: 98 F | SYSTOLIC BLOOD PRESSURE: 100 MMHG

## 2018-09-04 DIAGNOSIS — H90.3 SENSORINEURAL HEARING LOSS (SNHL) OF BOTH EARS: ICD-10-CM

## 2018-09-04 DIAGNOSIS — H60.391 ACUTE INFECTIVE OTITIS EXTERNA, RIGHT: Primary | ICD-10-CM

## 2018-09-04 PROCEDURE — 99213 OFFICE O/P EST LOW 20 MIN: CPT | Mod: S$PBB,,, | Performed by: ORTHOPAEDIC SURGERY

## 2018-09-04 PROCEDURE — 99999 PR PBB SHADOW E&M-EST. PATIENT-LVL IV: CPT | Mod: PBBFAC,,, | Performed by: ORTHOPAEDIC SURGERY

## 2018-09-04 PROCEDURE — 99214 OFFICE O/P EST MOD 30 MIN: CPT | Mod: PBBFAC,PO | Performed by: ORTHOPAEDIC SURGERY

## 2018-09-04 RX ORDER — DIFLUPREDNATE OPHTHALMIC 0.5 MG/ML
1 EMULSION OPHTHALMIC
COMMUNITY

## 2018-09-04 RX ORDER — CLOBETASOL PROPIONATE 0.5 MG/G
CREAM TOPICAL 2 TIMES DAILY
COMMUNITY
End: 2019-01-03 | Stop reason: ALTCHOICE

## 2018-09-04 NOTE — TELEPHONE ENCOUNTER
----- Message from Yissel Del Cid sent at 9/4/2018  2:09 PM CDT -----  Contact: Helene ([facility])  Please fax over the pt progress notes from the pt appt. Fax to [phone] and if there are any questions give them a call at [phone]

## 2018-09-04 NOTE — LETTER
September 5, 2018        Ramirez Gardner MD  [address] [facility]  [address]  [facility] [address]             [facility]  [address] [facility] [address]  [place] [address]  Phone: [phone]  Fax: [phone]   Patient: Luci Farias   MR Number: [id number]   YOB: 1950   Date of Visit: 9/4/2018       Dear Dr. Gardner:    Thank you for referring Luci Farias to me for evaluation. Attached you will find relevant portions of my assessment and plan of care.    If you have questions, please do not hesitate to call me. I look forward to following Luci Farias along with you.    Sincerely,      Teresa Alvarado MD          CC  No Recipients    Enclosure

## 2018-09-05 NOTE — PROGRESS NOTES
Subjective:       Patient ID: Luci Farias is a 67 y.o. female.    Chief Complaint: Follow-up (2 week follow up on right ear)    Patient is a very pleasant 67-year-old female here to see me today several weeks after foreign body removal from her right ear. She was seen here about a month ago at which time she had a dome from her hearing aid in her right ear. She wears a hearing aid only in her right ear, as her left ears to poor for amplification.  She has not been wearing her right hearing aid since her last visit.    She has now completed a course of oral and topical antibiotic therapy to her right ear, and she finally feels that her ears feeling much better and is back to normal.  She no longer has any ear pain or ear drainage.      Review of Systems   Constitutional: Negative for chills, fatigue, fever and unexpected weight change.   HENT: Positive for hearing loss. Negative for congestion, dental problem, ear discharge, ear pain, facial swelling, nosebleeds, postnasal drip, rhinorrhea, sinus pressure, sneezing, sore throat, tinnitus, trouble swallowing and voice change.    Eyes: Negative for redness, itching and visual disturbance.   Respiratory: Negative for cough, choking, shortness of breath and wheezing.    Cardiovascular: Negative for chest pain and palpitations.   Gastrointestinal: Negative for abdominal pain.        No reflux.   Musculoskeletal: Negative for gait problem.   Skin: Negative for rash.   Neurological: Negative for dizziness, light-headedness and headaches.       Objective:      Physical Exam   Constitutional: She is oriented to person, place, and time. She appears well-developed and well-nourished. No distress.   HENT:   Head: Normocephalic and atraumatic.   Right Ear: External ear and ear canal normal. No drainage, swelling or tenderness. No middle ear effusion. Decreased hearing is noted.   Left Ear: Tympanic membrane, external ear and ear canal normal.  No middle ear effusion.  Decreased hearing is noted.   Nose: Nose normal. No mucosal edema, rhinorrhea, nasal deformity or septal deviation. No epistaxis. Right sinus exhibits no maxillary sinus tenderness and no frontal sinus tenderness. Left sinus exhibits no maxillary sinus tenderness and no frontal sinus tenderness.   Mouth/Throat: Uvula is midline, oropharynx is clear and moist and mucous membranes are normal. Mucous membranes are not pale and not dry. No dental caries. No oropharyngeal exudate or posterior oropharyngeal erythema.   Eyes: Conjunctivae, EOM and lids are normal. Pupils are equal, round, and reactive to light. Right eye exhibits no chemosis. Left eye exhibits no chemosis. Right conjunctiva is not injected. Left conjunctiva is not injected. No scleral icterus. Right eye exhibits normal extraocular motion and no nystagmus. Left eye exhibits normal extraocular motion and no nystagmus.   Neck: Trachea normal and phonation normal. No tracheal tenderness present. No tracheal deviation present. No thyroid mass and no thyromegaly present.   Cardiovascular: Intact distal pulses.   Pulmonary/Chest: Effort normal. No stridor. No respiratory distress.   Abdominal: She exhibits no distension.   Lymphadenopathy:        Head (right side): No submental, no submandibular, no preauricular, no posterior auricular and no occipital adenopathy present.        Head (left side): No submental, no submandibular, no preauricular, no posterior auricular and no occipital adenopathy present.     She has no cervical adenopathy.   Neurological: She is alert and oriented to person, place, and time. No cranial nerve deficit.   Skin: Skin is warm and dry. No rash noted. No erythema.   Psychiatric: She has a normal mood and affect. Her behavior is normal.       Assessment:       1. Acute infective otitis externa, right    2. Sensorineural hearing loss (SNHL) of both ears        Plan:       1.  Acute otitis externa right ear:   Significantly improved in  completely resolved at this point.  She asked about recommendation for if water gets in her ear while bathing or washing her hair.  I would recommend over-the-counter swimmer's ear drops followed by hair drier once or twice weekly to fully dry her ear.  2.  Sensorineural hearing loss:    Okay to resume wearing her hearing aid in the right ear.  Reviewed her audiogram, no need for amplification of the left ear due to poor hearing on that side.

## 2018-09-18 ENCOUNTER — CLINICAL SUPPORT (OUTPATIENT)
Dept: AUDIOLOGY | Facility: CLINIC | Age: 68
End: 2018-09-18
Payer: MEDICARE

## 2018-09-18 ENCOUNTER — OFFICE VISIT (OUTPATIENT)
Dept: OTOLARYNGOLOGY | Facility: CLINIC | Age: 68
End: 2018-09-18
Payer: MEDICARE

## 2018-09-18 VITALS — HEIGHT: 64 IN | WEIGHT: 156.06 LBS | BODY MASS INDEX: 26.64 KG/M2

## 2018-09-18 DIAGNOSIS — H93.8X1 MASS OF RIGHT EAR CANAL: Primary | ICD-10-CM

## 2018-09-18 DIAGNOSIS — Z46.1 ENCOUNTER FOR FITTING AND ADJUSTMENT OF HEARING AID OF BOTH EARS: Primary | ICD-10-CM

## 2018-09-18 DIAGNOSIS — H90.6 MIXED CONDUCTIVE AND SENSORINEURAL HEARING LOSS OF BOTH EARS: ICD-10-CM

## 2018-09-18 PROCEDURE — 99214 OFFICE O/P EST MOD 30 MIN: CPT | Mod: PBBFAC,PO | Performed by: ORTHOPAEDIC SURGERY

## 2018-09-18 PROCEDURE — 99999 PR PBB SHADOW E&M-EST. PATIENT-LVL IV: CPT | Mod: PBBFAC,,, | Performed by: ORTHOPAEDIC SURGERY

## 2018-09-18 PROCEDURE — 99214 OFFICE O/P EST MOD 30 MIN: CPT | Mod: S$PBB,,, | Performed by: ORTHOPAEDIC SURGERY

## 2018-09-18 NOTE — PROGRESS NOTES
Temporarily changed pt's 8mm bass dome to an 8mm open dome while she has continued drainage from right ear per Dr. Alvarado' request.  Once ear is dry for a while, she will switch back to bass dome.Pt will RTC as needed

## 2018-09-19 NOTE — PROGRESS NOTES
Subjective:       Patient ID: Luci Farias is a 67 y.o. female.    Chief Complaint: Hearing Loss (slight hearing loss; loud thumping through day w/ most painful @ night)    Patient is a very pleasant 67-year-old female here to see me today in followup for her right ear. She was seen here about two months ago at which time she had a dome from her hearing aid in her right ear. She wears a hearing aid only in her right ear, as her left ears to poor for amplification.  She has is again wearing her hearing aid in her right ear.  However, she again has pain and drainage from her right ear.        Review of Systems   Constitutional: Negative for chills, fatigue, fever and unexpected weight change.   HENT: Positive for ear discharge, ear pain and hearing loss. Negative for congestion, dental problem, facial swelling, nosebleeds, postnasal drip, rhinorrhea, sinus pressure, sneezing, sore throat, tinnitus, trouble swallowing and voice change.    Eyes: Negative for redness, itching and visual disturbance.   Respiratory: Negative for cough, choking, shortness of breath and wheezing.    Cardiovascular: Negative for chest pain and palpitations.   Gastrointestinal: Negative for abdominal pain.        No reflux.   Musculoskeletal: Negative for gait problem.   Skin: Negative for rash.   Neurological: Negative for dizziness, light-headedness and headaches.       Objective:      Physical Exam   Constitutional: She is oriented to person, place, and time. She appears well-developed and well-nourished. No distress.   HENT:   Head: Normocephalic and atraumatic.   Right Ear: External ear and ear canal normal. There is drainage and tenderness. No swelling. Decreased hearing is noted.   Left Ear: Tympanic membrane, external ear and ear canal normal.  No middle ear effusion. Decreased hearing is noted.   Nose: Nose normal. No mucosal edema, rhinorrhea, nasal deformity or septal deviation. No epistaxis. Right sinus exhibits no maxillary  sinus tenderness and no frontal sinus tenderness. Left sinus exhibits no maxillary sinus tenderness and no frontal sinus tenderness.   Mouth/Throat: Uvula is midline, oropharynx is clear and moist and mucous membranes are normal. Mucous membranes are not pale and not dry. No dental caries. No oropharyngeal exudate or posterior oropharyngeal erythema.   Flesh colored mass filling right EAC   Eyes: Conjunctivae, EOM and lids are normal. Pupils are equal, round, and reactive to light. Right eye exhibits no chemosis. Left eye exhibits no chemosis. Right conjunctiva is not injected. Left conjunctiva is not injected. No scleral icterus. Right eye exhibits normal extraocular motion and no nystagmus. Left eye exhibits normal extraocular motion and no nystagmus.   Neck: Trachea normal and phonation normal. No tracheal tenderness present. No tracheal deviation present. No thyroid mass and no thyromegaly present.   Cardiovascular: Intact distal pulses.   Pulmonary/Chest: Effort normal. No stridor. No respiratory distress.   Abdominal: She exhibits no distension.   Lymphadenopathy:        Head (right side): No submental, no submandibular, no preauricular, no posterior auricular and no occipital adenopathy present.        Head (left side): No submental, no submandibular, no preauricular, no posterior auricular and no occipital adenopathy present.     She has no cervical adenopathy.   Neurological: She is alert and oriented to person, place, and time. No cranial nerve deficit.   Skin: Skin is warm and dry. No rash noted. No erythema.   Psychiatric: She has a normal mood and affect. Her behavior is normal.       Assessment:       1. Mass of right ear canal    2. Mixed conductive and sensorineural hearing loss of both ears        Plan:       1.  Mass of right ear canal:  She has a large mass filling her right EAC at this time, that was not seen at her previous visit.  At this point, will resume a steroid containing ear drop and  will have her return to clinic to see otology, Dr. Alacla, with a CT temporal bones and audiogram that day.  Per patient request, prescription was written for her to fill at her in house pharmacy.       2.  Hearing loss:  OK to wear hearing aid as able in the right ear.  Repeat audiogram as above.

## 2018-09-27 ENCOUNTER — OFFICE VISIT (OUTPATIENT)
Dept: OTOLARYNGOLOGY | Facility: CLINIC | Age: 68
End: 2018-09-27
Payer: MEDICARE

## 2018-09-27 ENCOUNTER — CLINICAL SUPPORT (OUTPATIENT)
Dept: AUDIOLOGY | Facility: CLINIC | Age: 68
End: 2018-09-27
Payer: MEDICARE

## 2018-09-27 ENCOUNTER — HOSPITAL ENCOUNTER (OUTPATIENT)
Dept: RADIOLOGY | Facility: HOSPITAL | Age: 68
Discharge: HOME | End: 2018-09-27
Attending: ORTHOPAEDIC SURGERY
Payer: MEDICARE

## 2018-09-27 VITALS
BODY MASS INDEX: 27.02 KG/M2 | DIASTOLIC BLOOD PRESSURE: 70 MMHG | SYSTOLIC BLOOD PRESSURE: 131 MMHG | HEART RATE: 73 BPM | WEIGHT: 157.44 LBS | TEMPERATURE: 97 F

## 2018-09-27 DIAGNOSIS — H90.8 HEARING LOSS, MIXED, UNILATERAL: Primary | ICD-10-CM

## 2018-09-27 DIAGNOSIS — H60.391 OTITIS, EXTERNA, INFECTIVE, RIGHT: Primary | ICD-10-CM

## 2018-09-27 DIAGNOSIS — H90.A32 MIXED CONDUCTIVE AND SENSORINEURAL HEARING LOSS OF LEFT EAR WITH RESTRICTED HEARING OF RIGHT EAR: ICD-10-CM

## 2018-09-27 DIAGNOSIS — H93.8X1 MASS OF EAR CANAL, RIGHT: ICD-10-CM

## 2018-09-27 DIAGNOSIS — H90.6 MIXED CONDUCTIVE AND SENSORINEURAL HEARING LOSS OF BOTH EARS: ICD-10-CM

## 2018-09-27 DIAGNOSIS — H93.8X1 MASS OF RIGHT EAR CANAL: ICD-10-CM

## 2018-09-27 PROCEDURE — 99214 OFFICE O/P EST MOD 30 MIN: CPT | Mod: S$PBB,,, | Performed by: OTOLARYNGOLOGY

## 2018-09-27 PROCEDURE — 92567 TYMPANOMETRY: CPT | Mod: PBBFAC,PO | Performed by: AUDIOLOGIST

## 2018-09-27 PROCEDURE — 70480 CT ORBIT/EAR/FOSSA W/O DYE: CPT | Mod: TC,PO

## 2018-09-27 PROCEDURE — 92557 COMPREHENSIVE HEARING TEST: CPT | Mod: PBBFAC,PO | Performed by: AUDIOLOGIST

## 2018-09-27 PROCEDURE — 99999 PR PBB SHADOW E&M-EST. PATIENT-LVL III: CPT | Mod: PBBFAC,,, | Performed by: OTOLARYNGOLOGY

## 2018-09-27 PROCEDURE — 70480 CT ORBIT/EAR/FOSSA W/O DYE: CPT | Mod: 26,,, | Performed by: RADIOLOGY

## 2018-09-27 PROCEDURE — 99213 OFFICE O/P EST LOW 20 MIN: CPT | Mod: PBBFAC,25,PO | Performed by: OTOLARYNGOLOGY

## 2018-09-27 RX ORDER — CIPROFLOXACIN AND DEXAMETHASONE 3; 1 MG/ML; MG/ML
4 SUSPENSION/ DROPS AURICULAR (OTIC) 2 TIMES DAILY
Qty: 7.5 ML | Refills: 2 | OUTPATIENT
Start: 2018-09-27 | End: 2018-09-27

## 2018-09-27 RX ORDER — CIPROFLOXACIN AND DEXAMETHASONE 3; 1 MG/ML; MG/ML
4 SUSPENSION/ DROPS AURICULAR (OTIC)
Status: DISCONTINUED | OUTPATIENT
Start: 2018-09-27 | End: 2018-09-27

## 2018-09-27 RX ORDER — CIPROFLOXACIN AND DEXAMETHASONE 3; 1 MG/ML; MG/ML
4 SUSPENSION/ DROPS AURICULAR (OTIC) 2 TIMES DAILY
Qty: 7.5 ML | Refills: 2 | Status: SHIPPED | OUTPATIENT
Start: 2018-09-27 | End: 2018-10-07

## 2018-09-27 NOTE — LETTER
September 27, 2018      Teresa Alvarado MD  [address] [place] [address] [address] [place] [id number]           [facility]  [address] [place] [address]  Phone: [phone]  Fax: [phone]          Patient: Luci Farias   MR Number: [id number]   YOB: 1950   Date of Visit: 9/27/2018       Dear Dr. Teresa Alvarado:    Thank you for referring Luci Farias to me for evaluation. Attached you will find relevant portions of my assessment and plan of care.    If you have questions, please do not hesitate to call me. I look forward to following Luci Farias along with you.    Sincerely,    Fredy Hernandez MD    Enclosure  CC:  No Recipients    If you would like to receive this communication electronically, please contact [email or web address] or [phone] to request more information on [facility] access.    For providers and/or their staff who would like to refer a patient to [facility], please contact us through our one-stop-shop provider referral line, [facility], at [phone].    If you feel you have received this communication in error or would no longer like to receive these types of communications, please e-mail [email or web address]

## 2018-09-28 NOTE — PROGRESS NOTES
Subjective:       Patient ID: Luci Farias is a 67 y.o. female.    Chief Complaint: Other (review audio )    HPI     66 yo with history of SNHL AU, recently has had issue with OE of AD due to trapped hearing aid cone. Had improvement with OE but at last visit mass of ear canal noted patient referred for evaluation.  This was note d 1 wk ago, has tried treatment with drops with minimal improvement.  Has associated worse hearing.  Is only able to communicate with writing at this time.    Review of Systems   Constitutional: Negative for chills and fever.   HENT: Negative for sore throat and trouble swallowing.    Respiratory: Negative for apnea and chest tightness.    Cardiovascular: Negative for chest pain.         Objective:      Physical Exam   Constitutional: She appears well-developed and well-nourished.   HENT:   Head: Normocephalic and atraumatic.   Right Ear: External ear normal. There is swelling (aural poly filling ear canal adherent to TM).   Left Ear: External ear normal. Tympanic membrane is scarred.   Nose: Nose normal.   Mouth/Throat: Uvula is midline, oropharynx is clear and moist and mucous membranes are normal.   Neck: Normal range of motion. No thyroid mass present.       Data:      Audiogram tracings independently reviewed, profound loss left, severe-profound loss AD however SD is 92% at 110    CT temporal bone shows  Soft tissue in lateral EAC with opacification of middle ear and mastoid    Assessment:       1. Otitis, externa, infective, right    2. 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  Pt is poor surgical candidate due to being on dialysis and multiple stents, currently lives in nursing home.      Spoke with patient's daughter Apryl on phone who has power of , will plan for biopsy and debridement of polyp in clinic at next visit in 2 weeks.   Daughter will consent on phone  Plan:    ciprodex daily   follow up in 2 wks for removal of aural polyp, with biopsy and cauterization

## 2018-10-02 ENCOUNTER — TELEPHONE (OUTPATIENT)
Dept: OTOLARYNGOLOGY | Facility: CLINIC | Age: 68
End: 2018-10-02

## 2018-10-02 NOTE — TELEPHONE ENCOUNTER
----- Message from Martina Gee MA sent at 10/2/2018  4:03 PM CDT -----  Contact: Paras or Jose Manuel Roca [phone]  Can you call Kailee and let her know?    ----- Message -----  From: Lizeth Carrasco LPN  Sent: 10/2/2018   3:54 PM  To: Martina Gee MA    No unfortunately she needs to be seen by the Otologist and that is the next date that he will be in clinic  ----- Message -----  From: Martina Gee MA  Sent: 10/2/2018   1:55 PM  To: GENIE Finley,    I spoke with Laura at the living facility, she is the nurse there and wants to know if the patient can be seen sooner than scheduled appointment because she is complaining of ear pain. She would like to speak with you if possible. [phone]    Martina  ----- Message -----  From: Richa Lucero  Sent: 10/2/2018  12:00 PM  To: Master Fredy Staff    She is calling to possibly get the pt's procedure moved up due to her being in severe pain, please advise, [phone]

## 2018-10-02 NOTE — TELEPHONE ENCOUNTER
Called and spoke with the pts nurse that is working with her and informed her to provide pt with OTC pain medications to help with the pain and that Dr Hernandez will be removing the mass to her ear 10/11/18. Call if there are any other questions.

## 2018-10-11 ENCOUNTER — OFFICE VISIT (OUTPATIENT)
Dept: OTOLARYNGOLOGY | Facility: CLINIC | Age: 68
End: 2018-10-11
Payer: MEDICARE

## 2018-10-11 VITALS
TEMPERATURE: 97 F | DIASTOLIC BLOOD PRESSURE: 58 MMHG | WEIGHT: 154.75 LBS | HEART RATE: 69 BPM | BODY MASS INDEX: 26.57 KG/M2 | SYSTOLIC BLOOD PRESSURE: 105 MMHG

## 2018-10-11 DIAGNOSIS — H93.8X1 EAR CANAL MASS, RIGHT: Primary | ICD-10-CM

## 2018-10-11 PROCEDURE — 69100 BIOPSY OF EXTERNAL EAR: CPT | Mod: PBBFAC,PO | Performed by: OTOLARYNGOLOGY

## 2018-10-11 PROCEDURE — 88312 SPECIAL STAINS GROUP 1: CPT | Mod: 26,,, | Performed by: PATHOLOGY

## 2018-10-11 PROCEDURE — 99214 OFFICE O/P EST MOD 30 MIN: CPT | Mod: PBBFAC,PO | Performed by: OTOLARYNGOLOGY

## 2018-10-11 PROCEDURE — 69100 BIOPSY OF EXTERNAL EAR: CPT | Mod: S$PBB,RT,, | Performed by: OTOLARYNGOLOGY

## 2018-10-11 PROCEDURE — 88305 TISSUE EXAM BY PATHOLOGIST: CPT | Performed by: PATHOLOGY

## 2018-10-11 PROCEDURE — 99499 UNLISTED E&M SERVICE: CPT | Mod: S$PBB,,, | Performed by: OTOLARYNGOLOGY

## 2018-10-11 PROCEDURE — 69210 REMOVE IMPACTED EAR WAX UNI: CPT | Mod: PBBFAC,PO | Performed by: OTOLARYNGOLOGY

## 2018-10-11 PROCEDURE — 99999 PR PBB SHADOW E&M-EST. PATIENT-LVL IV: CPT | Mod: PBBFAC,,, | Performed by: OTOLARYNGOLOGY

## 2018-10-11 PROCEDURE — 88305 TISSUE EXAM BY PATHOLOGIST: CPT | Mod: 26,,, | Performed by: PATHOLOGY

## 2018-10-11 RX ORDER — CIPROFLOXACIN AND DEXAMETHASONE 3; 1 MG/ML; MG/ML
4 SUSPENSION/ DROPS AURICULAR (OTIC) 2 TIMES DAILY
Qty: 7.5 ML | Refills: 2 | OUTPATIENT
Start: 2018-10-11 | End: 2018-10-25

## 2018-10-11 NOTE — PROCEDURES
"Biopsy of Ear canal mass  Date/Time: 10/11/2018 6:50 PM  Performed by: Fredy Hernandez MD  Authorized by: Fredy Hernandez MD     Time out: Immediately prior to procedure a "time out" was called to verify the correct patient, procedure, equipment, support staff and site/side marked as required.    Consent Done?:  Yes (Written) (obtained from daughter by phone)    Local anesthetic:  Lidocaine 1% with epinephrine  Location details:  Right ear  Patient tolerance:  Patient tolerated the procedure well with no immediate complications     View was obtained of the ear canal using the microscope.  There was a pulsatile mass in the distal ear canal, a biopsy was taken of the mass.  Bleeding was controlled with use of silver nitrate and afrin. A cottonball was then placed in the ear canal.    Plan  Follow up in 2 wks for path results  Continue topical drops  No hearing aid in right ear for now.      "

## 2018-10-19 ENCOUNTER — TELEPHONE (OUTPATIENT)
Dept: OTOLARYNGOLOGY | Facility: CLINIC | Age: 68
End: 2018-10-19

## 2018-10-19 NOTE — TELEPHONE ENCOUNTER
Spoke to daughter discussed, benign results of biopsy.  Patient has recently started dialysis.    Fredy Hernandez MD  Otology, Neurotology, and Skull Base Surgery  Department of Otorhinolaryngology  [facility]

## 2018-11-08 ENCOUNTER — OFFICE VISIT (OUTPATIENT)
Dept: OTOLARYNGOLOGY | Facility: CLINIC | Age: 68
End: 2018-11-08
Payer: MEDICARE

## 2018-11-08 ENCOUNTER — CLINICAL SUPPORT (OUTPATIENT)
Dept: AUDIOLOGY | Facility: CLINIC | Age: 68
End: 2018-11-08
Payer: MEDICARE

## 2018-11-08 VITALS
SYSTOLIC BLOOD PRESSURE: 98 MMHG | BODY MASS INDEX: 25.58 KG/M2 | WEIGHT: 149.06 LBS | DIASTOLIC BLOOD PRESSURE: 59 MMHG | HEART RATE: 70 BPM

## 2018-11-08 DIAGNOSIS — H90.A32 MIXED CONDUCTIVE AND SENSORINEURAL HEARING LOSS OF LEFT EAR WITH RESTRICTED HEARING OF RIGHT EAR: Primary | ICD-10-CM

## 2018-11-08 DIAGNOSIS — Z46.1 HEARING AID FITTING OR ADJUSTMENT: Primary | ICD-10-CM

## 2018-11-08 DIAGNOSIS — H65.491 CHRONIC NONSUPPURATIVE OTITIS MEDIA, RIGHT: ICD-10-CM

## 2018-11-08 PROCEDURE — 99999 PR PBB SHADOW E&M-EST. PATIENT-LVL III: CPT | Mod: PBBFAC,,, | Performed by: OTOLARYNGOLOGY

## 2018-11-08 PROCEDURE — 99213 OFFICE O/P EST LOW 20 MIN: CPT | Mod: S$GLB,,, | Performed by: OTOLARYNGOLOGY

## 2018-11-08 NOTE — PROGRESS NOTES
Subjective:       Patient ID: Luci Farias is a 67 y.o. female.    Chief Complaint: Follow-up    HPI     66 yo with history of severe SNHL AU, with development of ear canal chronic OE with large polyp filling canal.  Biopsy benign. Here today for further treatment.  Reports improvement in drainage, denies associated pain.  Drainage is now intermittent.      Review of Systems   Constitutional: Negative for chills and fever.   HENT: Negative for sore throat and trouble swallowing.    Respiratory: Negative for apnea and chest tightness.    Cardiovascular: Negative for chest pain.         Objective:      Physical Exam   Constitutional: She appears well-developed and well-nourished.   HENT:   Head: Normocephalic and atraumatic.   Right Ear: External ear normal. There is swelling (aural polyp attached to anterior TM, some mucoid drainage medially in the canal.  ).   Left Ear: External ear normal. Tympanic membrane is scarred.   Nose: Nose normal.   Mouth/Throat: Uvula is midline, oropharynx is clear and moist and mucous membranes are normal.   Binocular microscopy:    The microscope, suction, and forceps were used to remove more of the EAC polyp, after removal area was cauterized further with silver nitrate x 3 sticks under microscopic guidance.     Neck: Normal range of motion. No thyroid mass present.       Data:      Audiogram tracings independently reviewed, profound loss left, severe-profound loss AD however SD is 92% at 110    CT temporal bone shows  Soft tissue in lateral EAC with opacification of middle ear and mastoid    Assessment:       1. Mixed conductive and sensorineural hearing loss of left ear with restricted hearing of right ear    2. Chronic nonsuppurative otitis media, right        Pt unable to wear HA due to chronic OE of right ear now with aural polyp.  Pt is poor surgical candidate due to being on dialysis and multiple stents, currently lives in nursing home.      Polyp overall appears to be  resolving, may need PE tube placement once infection is under control  As CT shows evidence of middle ear effusion.  Plan:   Improving, continue gtt  Ok to wear hearing aid if not draining  Follow up in ~3 weeks

## 2018-11-08 NOTE — PROGRESS NOTES
Luci Farias was seen 11/08/2018 for a hearing aid follow-up appointment.  She has been seeing Dr. Hernandez regarding a polyp on her right ear drum. She has not been able to wear her hearing aid and she is desperate to do so. Her hearing was tested and it revealed a severe to profound mixed hearing loss and it is worse than her audiogram from 9/27/18.     Dr. Hernandez said she was cleared to wear her hearing aid if the ear is not draining. I reprogrammed her aid with it's (1) 85 speaker and it was not powerful enough. I reprogrammed it with a (2) 100 speaker and 6 mm power dome and she had good understanding at step 2. Step 3 was too much.     When I called her on her Iphone, she was able to understand me well.    She was so very happy to be able to have some hearing back with her hearing aid. She will wear the aid for her dialysis treatments 5:30-11:00, 3 days a week. She will also wear it when she absolutely must communicate with others, but not all day everyday. She understands.          Patient will return 12/13/18 to see Dr. Hernandez.

## 2018-12-06 ENCOUNTER — CLINICAL SUPPORT (OUTPATIENT)
Dept: AUDIOLOGY | Facility: CLINIC | Age: 68
End: 2018-12-06
Payer: MEDICARE

## 2018-12-06 DIAGNOSIS — Z46.1 HEARING AID FITTING OR ADJUSTMENT: Primary | ICD-10-CM

## 2018-12-06 NOTE — PROGRESS NOTES
Luci Farias was seen 12/06/2018 for a hearing aid follow-up appointment.  Her right power speaker unit that I loaned her is dead. It was replaced and now aid is in good working order.      Patient will call for any future hearing aid follow-up appointments as needed.

## 2018-12-13 ENCOUNTER — OFFICE VISIT (OUTPATIENT)
Dept: OTOLARYNGOLOGY | Facility: CLINIC | Age: 68
End: 2018-12-13
Payer: MEDICARE

## 2018-12-13 ENCOUNTER — TELEPHONE (OUTPATIENT)
Dept: OTOLARYNGOLOGY | Facility: CLINIC | Age: 68
End: 2018-12-13

## 2018-12-13 VITALS
DIASTOLIC BLOOD PRESSURE: 90 MMHG | TEMPERATURE: 98 F | HEART RATE: 82 BPM | WEIGHT: 150.38 LBS | BODY MASS INDEX: 25.81 KG/M2 | SYSTOLIC BLOOD PRESSURE: 168 MMHG

## 2018-12-13 DIAGNOSIS — H90.A32 MIXED CONDUCTIVE AND SENSORINEURAL HEARING LOSS OF LEFT EAR WITH RESTRICTED HEARING OF RIGHT EAR: Primary | ICD-10-CM

## 2018-12-13 DIAGNOSIS — H61.891 POLYP OF EAR CANAL, RIGHT: ICD-10-CM

## 2018-12-13 PROCEDURE — 99214 OFFICE O/P EST MOD 30 MIN: CPT | Mod: S$GLB,,, | Performed by: OTOLARYNGOLOGY

## 2018-12-13 PROCEDURE — 99999 PR PBB SHADOW E&M-EST. PATIENT-LVL III: CPT | Mod: PBBFAC,,, | Performed by: OTOLARYNGOLOGY

## 2018-12-13 NOTE — TELEPHONE ENCOUNTER
"The following message was sent to Sangita Gee as an Inbasket!    "Pt was seen by Dr Hernandez and he wants to do a   Right Tympanoplasty PE tube removal on 12/21/18    I am scheduling the 1 week follow up and giving pt the folder of information.    Thanks!"    I was informed this is how we are suppose to proceed with scheduling a surgery for Dr Hernandez.    "

## 2018-12-14 ENCOUNTER — TELEPHONE (OUTPATIENT)
Dept: OTOLARYNGOLOGY | Facility: CLINIC | Age: 68
End: 2018-12-14

## 2018-12-14 DIAGNOSIS — H90.A32 MIXED CONDUCTIVE AND SENSORINEURAL HEARING LOSS OF LEFT EAR WITH RESTRICTED HEARING OF RIGHT EAR: Primary | ICD-10-CM

## 2018-12-14 DIAGNOSIS — H61.891 POLYP OF EAR CANAL, RIGHT: ICD-10-CM

## 2018-12-14 NOTE — H&P (VIEW-ONLY)
Subjective:       Patient ID: Luci Farias is a 68 y.o. female.    Chief Complaint: Other (follow up)    HPI       69 yo with history of severe SNHL AU, with development of ear canal chronic OE with large polyp filling canal.  Biopsy benign. Here today for further follow up.  Has been using hearing aid without issue.  States ear feels well, has been treating with drops but has not noted improvement in hearing.  Still having otorrhea.  Health has improved with being on dialysis, recently evaluated by cardiologist and was told is doing well.  Review of Systems   Constitutional: Negative for chills and fever.   HENT: Negative for sore throat and trouble swallowing.    Respiratory: Negative for apnea and chest tightness.    Cardiovascular: Negative for chest pain.         Objective:      Physical Exam   Constitutional: She appears well-developed and well-nourished.   HENT:   Head: Normocephalic and atraumatic.   Right Ear: External ear normal. There is swelling (aural polyp attached to anterior TM, some serous drainage from behind polyp unclear if there is a perforatio cheryl polyp obscures view of TM).   Left Ear: External ear normal. Tympanic membrane is scarred.   Nose: Nose normal.   Mouth/Throat: Uvula is midline, oropharynx is clear and moist and mucous membranes are normal.   Neck: Normal range of motion. No thyroid mass present.       Data:      Audiogram tracings independently reviewed, profound loss left, severe-profound loss AD however SD is 92% at 110    CT temporal bone shows  Soft tissue in lateral EAC with opacification of middle ear and mastoid    Assessment:       1. Mixed conductive and sensorineural hearing loss of left ear with restricted hearing of right ear    2. Polyp of ear canal, right        Polyp has not responded to steroid drops and repeated silver nitrate treatment.  Recommend removal in OR with EUA, possible PE tube placement, possible tympanoplasty right ear    Plan:   EUA with removal  of aural polyp possible PE tube placement, possible tympanoplasty right ear  To OR on 12/21/2018  Recommend stopping blood thinner if possible x 5 days

## 2018-12-14 NOTE — PROGRESS NOTES
Subjective:       Patient ID: Luci Farias is a 68 y.o. female.    Chief Complaint: Other (follow up)    HPI       67 yo with history of severe SNHL AU, with development of ear canal chronic OE with large polyp filling canal.  Biopsy benign. Here today for further follow up.  Has been using hearing aid without issue.  States ear feels well, has been treating with drops but has not noted improvement in hearing.  Still having otorrhea.  Health has improved with being on dialysis, recently evaluated by cardiologist and was told is doing well.  Review of Systems   Constitutional: Negative for chills and fever.   HENT: Negative for sore throat and trouble swallowing.    Respiratory: Negative for apnea and chest tightness.    Cardiovascular: Negative for chest pain.         Objective:      Physical Exam   Constitutional: She appears well-developed and well-nourished.   HENT:   Head: Normocephalic and atraumatic.   Right Ear: External ear normal. There is swelling (aural polyp attached to anterior TM, some serous drainage from behind polyp unclear if there is a perforatio cheryl polyp obscures view of TM).   Left Ear: External ear normal. Tympanic membrane is scarred.   Nose: Nose normal.   Mouth/Throat: Uvula is midline, oropharynx is clear and moist and mucous membranes are normal.   Neck: Normal range of motion. No thyroid mass present.       Data:      Audiogram tracings independently reviewed, profound loss left, severe-profound loss AD however SD is 92% at 110    CT temporal bone shows  Soft tissue in lateral EAC with opacification of middle ear and mastoid    Assessment:       1. Mixed conductive and sensorineural hearing loss of left ear with restricted hearing of right ear    2. Polyp of ear canal, right        Polyp has not responded to steroid drops and repeated silver nitrate treatment.  Recommend removal in OR with EUA, possible PE tube placement, possible tympanoplasty right ear    Plan:   EUA with removal  of aural polyp possible PE tube placement, possible tympanoplasty right ear  To OR on 12/21/2018  Recommend stopping blood thinner if possible x 5 days

## 2018-12-14 NOTE — TELEPHONE ENCOUNTER
----- Message from Faiza Díaz sent at 12/13/2018  3:15 PM CST -----  Contact: Kailee/[facility]  Hey she said that the patient told her the 12/21/18 and she needs to make sure because if so than it needs to be reschedule.  ----- Message -----  From: Martina Gee MA  Sent: 12/13/2018   3:02 PM  To: Faiza Kendall,     This patient was seen today, Dr. Hernandez and the staff should have discuss this with the patient. Please inform the staff who is working with him she wants to discuss more. I will schedule the appointment when I speak with Dr. Hernandez on next week.    Martina Gee MA  ----- Message -----  From: Faiza Díaz  Sent: 12/13/2018   2:40 PM  To: Master Fredy Staff    Kailee would like a call back at [phone] if not there ask for Marlene, Regards to when patient  procedure is schedule for.    Thanks  Td

## 2018-12-18 ENCOUNTER — TELEPHONE (OUTPATIENT)
Dept: OTOLARYNGOLOGY | Facility: CLINIC | Age: 68
End: 2018-12-18

## 2018-12-18 NOTE — TELEPHONE ENCOUNTER
----- Message from Tremaine Wood sent at 12/18/2018  9:57 AM CST -----  Michael Roca ) is requesting a call from nurse to r/s pt procedure to a am appt.        Please call Michael Roca ) back at [phone]

## 2019-01-03 ENCOUNTER — ANESTHESIA EVENT (OUTPATIENT)
Dept: SURGERY | Facility: HOSPITAL | Age: 69
End: 2019-01-03
Payer: MEDICARE

## 2019-01-03 RX ORDER — METOPROLOL SUCCINATE 25 MG/1
25 TABLET, EXTENDED RELEASE ORAL EVERY MORNING
COMMUNITY

## 2019-01-03 RX ORDER — PANTOPRAZOLE SODIUM 40 MG/1
40 TABLET, DELAYED RELEASE ORAL EVERY MORNING
COMMUNITY
End: 2019-02-21

## 2019-01-03 RX ORDER — SEVELAMER CARBONATE 800 MG/1
800 TABLET, FILM COATED ORAL 4 TIMES DAILY
COMMUNITY

## 2019-01-03 NOTE — ANESTHESIA PREPROCEDURE EVALUATION
[facility]  Anesthesia Pre-Operative Evaluation         Patient Name: Luci Farias  YOB: 1950  MRN: [id number]    SUBJECTIVE:     Pre-operative evaluation for Procedure(s) (LRB):  Tympanoplasty possible PE Tube removal (Right)     01/03/2019    Luci Farias is a 68 y.o. female w/ a significant PMHx of CAD (s/p stent mid-LAD 2016, on plavix), HFpEF (EF 65%), T2DM, ESRD on HD (MWF), and hypothyroidism. Patient has sensorineural hearing loss with development of ear canal chronic OE with large polyp filling R canal.    Patient now presents for the above procedure(s).      LDA: None documented.    Prev airway:   GETA at OSH (7/30/2018)  Grade II with Mac #3    Drips: None documented.    Patient Active Problem List   Diagnosis    Otitis, externa, infective, right       Review of patient's allergies indicates:   Allergen Reactions    Codeine Other (See Comments)     Unknown reaction    Darvon [propoxyphene] Other (See Comments)     Unknown reaction    Demerol [meperidine] Other (See Comments)     Unknown reaction    Penicillins Other (See Comments)     Unknown reaction       Current Outpatient Medications:  No current facility-administered medications for this encounter.     Current Outpatient Medications:     metoprolol succinate (TOPROL-XL) 25 MG 24 hr tablet, Take 25 mg by mouth every morning., Disp: , Rfl:     pantoprazole (PROTONIX) 40 MG tablet, Take 40 mg by mouth every morning., Disp: , Rfl:     sevelamer carbonate (RENVELA) 800 mg Tab, Take 800 mg by mouth 4 (four) times daily., Disp: , Rfl:     acetaminophen (TYLENOL) 325 MG tablet, Take 650 mg by mouth every 4 (four) hours as needed for Pain., Disp: , Rfl:     aspirin 81 MG Chew, Take 81 mg by mouth every morning. , Disp: , Rfl:     bevacizumab 2.5 mg/0.1 mL Syrg, by Intravitreal route every 30 days., Disp: , Rfl:     bumetanide (BUMEX) 2 MG tablet, Take 2 mg by mouth 2 (two) times daily. , Disp: , Rfl:      calcium-vitamin D3 (CALCIUM 500 + D) 500 mg(1,250mg) -200 unit per tablet, Take 1 tablet by mouth 2 (two) times daily with meals., Disp: , Rfl:     clopidogrel (PLAVIX) 75 mg tablet, Take 75 mg by mouth every morning. , Disp: , Rfl:     dextran 70-hypromellose (TEARS) ophthalmic solution, Place 1 drop into both eyes 4 (four) times daily., Disp: , Rfl:     difluprednate (DUREZOL) 0.05 % Drop ophthalmic solution, 1 drop. One drop into left eye once a day. One drip into right eye three times a day, Disp: , Rfl:     docusate calcium (SURFAK) 240 mg capsule, Take 240 mg by mouth every morning. , Disp: , Rfl:     epoetin jose (PROCRIT) 40,000 unit/mL injection, Inject 40,000 Units into the skin. Weekly prn, Disp: , Rfl:     ergocalciferol (DRISDOL) 50,000 unit Cap, Take 50,000 Units by mouth every 7 days. Takes on Thursdays., Disp: , Rfl:     ferrous sulfate 324 mg (65 mg iron) TbEC, Take 325 mg by mouth 3 (three) times daily. , Disp: , Rfl:     fluticasone (FLONASE) 50 mcg/actuation nasal spray, 1 spray by Each Nare route nightly. , Disp: , Rfl:     gabapentin (NEURONTIN) 100 MG capsule, Take 100 mg by mouth 3 (three) times daily., Disp: , Rfl:     insulin aspart (NOVOLOG) 100 unit/mL injection, Inject into the skin 3 (three) times daily before meals. Sliding scale , Disp: , Rfl:     levothyroxine (SYNTHROID, LEVOTHROID) 175 MCG tablet, Take 175 mcg by mouth every morning. , Disp: , Rfl:     loratadine (CLARITIN) 10 mg tablet, Take 10 mg by mouth every morning. , Disp: , Rfl:     metOLazone (ZAROXOLYN) 2.5 MG tablet, Take 2.5 mg by mouth. One tablet on Mondays and Thursdays when weight is over 159 LBS., Disp: , Rfl:     polyethylene glycol (GLYCOLAX) 17 gram PwPk, Take by mouth every morning. , Disp: , Rfl:     ranolazine (RANEXA) 500 MG Tb12, Take 500 mg by mouth 2 (two) times daily., Disp: , Rfl:     rosuvastatin (CRESTOR) 20 MG tablet, Take 20 mg by mouth nightly. , Disp: , Rfl:     traMADol (ULTRAM)  50 mg tablet, Take 50 mg by mouth every 8 (eight) hours as needed for Pain., Disp: , Rfl:     traZODone (DESYREL) 50 MG tablet, Take 50 mg by mouth nightly. , Disp: , Rfl:     valsartan (DIOVAN) 320 MG tablet, Take 320 mg by mouth every morning. , Disp: , Rfl:     Past Surgical History:   Procedure Laterality Date    Cataract Surgery Bilateral      SECTION      CHOLECYSTECTOMY      HYSTERECTOMY      right knee surgery      THYROID SURGERY      TONSILLECTOMY         Social History     Socioeconomic History    Marital status:      Spouse name: Not on file    Number of children: Not on file    Years of education: Not on file    Highest education level: Not on file   Social Needs    Financial resource strain: Not on file    Food insecurity - worry: Not on file    Food insecurity - inability: Not on file    Transportation needs - medical: Not on file    Transportation needs - non-medical: Not on file   Occupational History    Not on file   Tobacco Use    Smoking status: Former Smoker     Last attempt to quit: 2016     Years since quittin.9    Smokeless tobacco: Never Used   Substance and Sexual Activity    Alcohol use: No    Drug use: No    Sexual activity: Not on file   Other Topics Concern    Not on file   Social History Narrative    Not on file       OBJECTIVE:     Vital Signs Range (Last 24H):         Significant Labs:  Lab Results   Component Value Date    WBC 8.82 2011    HGB 13.1 2011    HCT 39.8 2011     2011    ALT 42 2011    AST 18 2011     (L) 2011    K 4.0 2011    CL 98 2011    CREATININE 1.3 2011    BUN 20 2011    CO2 25.3 2011       Diagnostic Studies: No relevant studies.    EKG (2018):  Normal sinus rhythm  Normal ECG  Compared to ECG 2018 10:34:02  No significant changes    2D ECHO (2018) @ OSH:  · Left Ventricle: Normal left ventricle cavity size. Normal  (55-65%)   ejection fraction. Mild concentric hypertrophy observed  · Grade I (mild) left ventricular diastolic dysfunction  · Mitral Valve: There is mild valve leaflet calcification. There is mild   mitral annular calcification. Mild mitral regurgitation.  · Left Atrium: Cavity is mildly dilated.      ASSESSMENT/PLAN:           Anesthesia Evaluation    I have reviewed the Patient Summary Reports.    I have reviewed the Nursing Notes.   I have reviewed the Medications.     Review of Systems  Anesthesia Hx:  No problems with previous Anesthesia  History of prior surgery of interest to airway management or planning:  Denies Personal Hx of Anesthesia complications.   Social:  Former Smoker    EENT/Dental:   Severe hearing loss bilaterally       Cardiovascular:   Hypertension CAD (s/p stent) asymptomatic  CHF (diastolic)    Pulmonary:   Denies COPD. Shortness of breath  Denies Recent URI. Shortness of breath with activity-deconditioned   Renal/:   Chronic Renal Disease, ESRD, Dialysis    Hepatic/GI:  Hepatic/GI Normal  Denies GERD.    Musculoskeletal:  Musculoskeletal Normal    Neurological:  Neurology Normal  Denies CVA.    Endocrine:   Diabetes, type 2, using insulin Hypothyroidism    Psych:  Psychiatric Normal           Physical Exam  General:  Well nourished    Airway/Jaw/Neck:  Airway Findings: Mouth Opening: Normal Tongue: Normal  General Airway Assessment: Adult  Mallampati: II  Improves to II with phonation.  TM Distance: Normal, at least 6 cm  Jaw/Neck Findings:  Neck ROM: Normal ROM     Eyes/Ears/Nose:  Eyes/Ears/Nose Findings: Severe hearing loss necessitates yelling into right ear     Dental:  Dental Findings: Upper Dentures, Lower Dentures   Chest/Lungs:  Chest/Lungs Findings: Clear to auscultation, Normal Respiratory Rate     Heart/Vascular:  Heart Findings: Rate: Normal  Rhythm: Regular Rhythm  Sounds: Normal        Mental Status:  Mental Status Findings: Normal        Anesthesia Plan  Type of  Anesthesia, risks & benefits discussed:  Anesthesia Type:  general  Patient's Preference:   Intra-op Monitoring Plan: standard ASA monitors  Intra-op Monitoring Plan Comments:   Post Op Pain Control Plan: multimodal analgesia, IV/PO Opioids PRN and per primary service following discharge from PACU  Post Op Pain Control Plan Comments:   Induction:   IV  Beta Blocker:  Patient is on a Beta-Blocker and has received one dose within the past 24 hours (No further documentation required).       Informed Consent:    ASA Score: 3     Day of Surgery Review of History & Physical: I have interviewed and examined the patient. I have reviewed the patient's H&P dated: 12/13/18. There are no significant changes.          Ready For Surgery From Anesthesia Perspective.

## 2019-01-03 NOTE — PRE-PROCEDURE INSTRUCTIONS
Spoke with Nurse Philly Dugan at [facility] [phone].  Asked if she can fax Ms. Farias's medication list.    Called back and spoke to Marlene - 2-10 pm Nurse.  NPO and medication instructions reviewed.  Did not know her reaction to the medications that she is allergic to.  Aspirin and Plavix are on Hold.  Has Dialysis on Mondays, Wednesdays, and Fridays.  Will go to Dialysis on Saturday 1-5-19 instead of tomorrow.  Her morning glucose readings have been in the 200s.  Marlene verbalized understanding of instructions.

## 2019-01-04 ENCOUNTER — ANESTHESIA (OUTPATIENT)
Dept: SURGERY | Facility: HOSPITAL | Age: 69
End: 2019-01-04
Payer: MEDICARE

## 2019-01-04 ENCOUNTER — HOSPITAL ENCOUNTER (OUTPATIENT)
Facility: HOSPITAL | Age: 69
Discharge: HOME | End: 2019-01-04
Attending: OTOLARYNGOLOGY | Admitting: OTOLARYNGOLOGY
Payer: MEDICARE

## 2019-01-04 VITALS
WEIGHT: 150 LBS | HEART RATE: 77 BPM | OXYGEN SATURATION: 100 % | DIASTOLIC BLOOD PRESSURE: 64 MMHG | TEMPERATURE: 98 F | SYSTOLIC BLOOD PRESSURE: 144 MMHG | RESPIRATION RATE: 17 BRPM | HEIGHT: 64 IN | BODY MASS INDEX: 25.61 KG/M2

## 2019-01-04 DIAGNOSIS — H61.891 POLYP OF RIGHT EAR CANAL: ICD-10-CM

## 2019-01-04 DIAGNOSIS — H61.899: Primary | ICD-10-CM

## 2019-01-04 LAB
POCT GLUCOSE: 142 MG/DL (ref 70–110)
POCT GLUCOSE: 202 MG/DL (ref 70–110)

## 2019-01-04 PROCEDURE — 69540 PR REMV AURAL POLYP: ICD-10-PCS | Mod: 51,RT,, | Performed by: OTOLARYNGOLOGY

## 2019-01-04 PROCEDURE — 63600175 PHARM REV CODE 636 W HCPCS: Performed by: STUDENT IN AN ORGANIZED HEALTH CARE EDUCATION/TRAINING PROGRAM

## 2019-01-04 PROCEDURE — 69436 PR CREATE EARDRUM OPENING,GEN ANESTH: ICD-10-PCS | Mod: RT,,, | Performed by: OTOLARYNGOLOGY

## 2019-01-04 PROCEDURE — 25000003 PHARM REV CODE 250: Performed by: STUDENT IN AN ORGANIZED HEALTH CARE EDUCATION/TRAINING PROGRAM

## 2019-01-04 PROCEDURE — 88304 TISSUE EXAM BY PATHOLOGIST: CPT | Performed by: PATHOLOGY

## 2019-01-04 PROCEDURE — D9220A PRA ANESTHESIA: ICD-10-PCS | Mod: ,,, | Performed by: ANESTHESIOLOGY

## 2019-01-04 PROCEDURE — D9220A PRA ANESTHESIA: Mod: ,,, | Performed by: ANESTHESIOLOGY

## 2019-01-04 PROCEDURE — 27201423 OPTIME MED/SURG SUP & DEVICES STERILE SUPPLY: Performed by: OTOLARYNGOLOGY

## 2019-01-04 PROCEDURE — 36000707: Performed by: OTOLARYNGOLOGY

## 2019-01-04 PROCEDURE — 88304 TISSUE EXAM BY PATHOLOGIST: CPT | Mod: 26,,, | Performed by: PATHOLOGY

## 2019-01-04 PROCEDURE — 36000706: Performed by: OTOLARYNGOLOGY

## 2019-01-04 PROCEDURE — 27800903 OPTIME MED/SURG SUP & DEVICES OTHER IMPLANTS: Performed by: OTOLARYNGOLOGY

## 2019-01-04 PROCEDURE — 37000009 HC ANESTHESIA EA ADD 15 MINS: Performed by: OTOLARYNGOLOGY

## 2019-01-04 PROCEDURE — 82962 GLUCOSE BLOOD TEST: CPT | Performed by: OTOLARYNGOLOGY

## 2019-01-04 PROCEDURE — 25000003 PHARM REV CODE 250: Performed by: OTOLARYNGOLOGY

## 2019-01-04 PROCEDURE — 69436 CREATE EARDRUM OPENING: CPT | Mod: RT,,, | Performed by: OTOLARYNGOLOGY

## 2019-01-04 PROCEDURE — 71000039 HC RECOVERY, EACH ADD'L HOUR: Performed by: OTOLARYNGOLOGY

## 2019-01-04 PROCEDURE — 71000033 HC RECOVERY, INTIAL HOUR: Performed by: OTOLARYNGOLOGY

## 2019-01-04 PROCEDURE — 37000008 HC ANESTHESIA 1ST 15 MINUTES: Performed by: OTOLARYNGOLOGY

## 2019-01-04 PROCEDURE — 88304 TISSUE SPECIMEN TO PATHOLOGY - SURGERY: ICD-10-PCS | Mod: 26,,, | Performed by: PATHOLOGY

## 2019-01-04 PROCEDURE — 69540 EXCISION AURAL POLYP: CPT | Mod: 51,RT,, | Performed by: OTOLARYNGOLOGY

## 2019-01-04 PROCEDURE — 82962 GLUCOSE BLOOD TEST: CPT | Mod: 91 | Performed by: OTOLARYNGOLOGY

## 2019-01-04 PROCEDURE — 71000015 HC POSTOP RECOV 1ST HR: Performed by: OTOLARYNGOLOGY

## 2019-01-04 DEVICE — TUBE VENT FLUORO 1.14M: Type: IMPLANTABLE DEVICE | Site: EAR | Status: FUNCTIONAL

## 2019-01-04 RX ORDER — OXYMETAZOLINE HCL 0.05 %
SPRAY, NON-AEROSOL (ML) NASAL
Status: DISCONTINUED | OUTPATIENT
Start: 2019-01-04 | End: 2019-01-04 | Stop reason: HOSPADM

## 2019-01-04 RX ORDER — FENTANYL CITRATE 50 UG/ML
INJECTION, SOLUTION INTRAMUSCULAR; INTRAVENOUS
Status: DISCONTINUED | OUTPATIENT
Start: 2019-01-04 | End: 2019-01-04

## 2019-01-04 RX ORDER — ACETAMINOPHEN 10 MG/ML
INJECTION, SOLUTION INTRAVENOUS
Status: DISCONTINUED | OUTPATIENT
Start: 2019-01-04 | End: 2019-01-04

## 2019-01-04 RX ORDER — ONDANSETRON 2 MG/ML
4 INJECTION INTRAMUSCULAR; INTRAVENOUS DAILY PRN
Status: DISCONTINUED | OUTPATIENT
Start: 2019-01-04 | End: 2019-01-04 | Stop reason: HOSPADM

## 2019-01-04 RX ORDER — DEXAMETHASONE SODIUM PHOSPHATE 4 MG/ML
INJECTION, SOLUTION INTRA-ARTICULAR; INTRALESIONAL; INTRAMUSCULAR; INTRAVENOUS; SOFT TISSUE
Status: DISCONTINUED | OUTPATIENT
Start: 2019-01-04 | End: 2019-01-04

## 2019-01-04 RX ORDER — SODIUM CHLORIDE 0.9 % (FLUSH) 0.9 %
5 SYRINGE (ML) INJECTION
Status: DISCONTINUED | OUTPATIENT
Start: 2019-01-04 | End: 2019-01-04 | Stop reason: HOSPADM

## 2019-01-04 RX ORDER — SODIUM CHLORIDE 0.9 % (FLUSH) 0.9 %
3 SYRINGE (ML) INJECTION
Status: DISCONTINUED | OUTPATIENT
Start: 2019-01-04 | End: 2019-01-04 | Stop reason: HOSPADM

## 2019-01-04 RX ORDER — PROPOFOL 10 MG/ML
VIAL (ML) INTRAVENOUS
Status: DISCONTINUED | OUTPATIENT
Start: 2019-01-04 | End: 2019-01-04

## 2019-01-04 RX ORDER — LIDOCAINE HYDROCHLORIDE 10 MG/ML
1 INJECTION, SOLUTION EPIDURAL; INFILTRATION; INTRACAUDAL; PERINEURAL ONCE
Status: COMPLETED | OUTPATIENT
Start: 2019-01-04 | End: 2019-01-04

## 2019-01-04 RX ORDER — MIDAZOLAM HYDROCHLORIDE 1 MG/ML
INJECTION, SOLUTION INTRAMUSCULAR; INTRAVENOUS
Status: DISCONTINUED | OUTPATIENT
Start: 2019-01-04 | End: 2019-01-04

## 2019-01-04 RX ORDER — CIPROFLOXACIN AND DEXAMETHASONE 3; 1 MG/ML; MG/ML
4 SUSPENSION/ DROPS AURICULAR (OTIC) 2 TIMES DAILY
Qty: 7.5 ML | Refills: 0 | Status: SHIPPED | OUTPATIENT
Start: 2019-01-04 | End: 2019-02-21

## 2019-01-04 RX ORDER — HYDROCODONE BITARTRATE AND ACETAMINOPHEN 5; 325 MG/1; MG/1
1 TABLET ORAL EVERY 6 HOURS PRN
Status: DISCONTINUED | OUTPATIENT
Start: 2019-01-04 | End: 2019-01-04 | Stop reason: HOSPADM

## 2019-01-04 RX ORDER — LIDOCAINE HCL/PF 100 MG/5ML
SYRINGE (ML) INTRAVENOUS
Status: DISCONTINUED | OUTPATIENT
Start: 2019-01-04 | End: 2019-01-04

## 2019-01-04 RX ORDER — FENTANYL CITRATE 50 UG/ML
25 INJECTION, SOLUTION INTRAMUSCULAR; INTRAVENOUS EVERY 5 MIN PRN
Status: DISCONTINUED | OUTPATIENT
Start: 2019-01-04 | End: 2019-01-04 | Stop reason: HOSPADM

## 2019-01-04 RX ORDER — CLINDAMYCIN PHOSPHATE 900 MG/50ML
900 INJECTION, SOLUTION INTRAVENOUS
Status: DISCONTINUED | OUTPATIENT
Start: 2019-01-04 | End: 2019-01-04 | Stop reason: HOSPADM

## 2019-01-04 RX ORDER — SODIUM CHLORIDE 9 MG/ML
INJECTION, SOLUTION INTRAVENOUS CONTINUOUS PRN
Status: DISCONTINUED | OUTPATIENT
Start: 2019-01-04 | End: 2019-01-04

## 2019-01-04 RX ORDER — OFLOXACIN 3 MG/ML
SOLUTION/ DROPS OPHTHALMIC
Status: DISCONTINUED | OUTPATIENT
Start: 2019-01-04 | End: 2019-01-04 | Stop reason: HOSPADM

## 2019-01-04 RX ORDER — CISATRACURIUM BESYLATE 2 MG/ML
INJECTION, SOLUTION INTRAVENOUS
Status: DISCONTINUED
Start: 2019-01-04 | End: 2019-01-04 | Stop reason: HOSPADM

## 2019-01-04 RX ORDER — HYDROCODONE BITARTRATE AND ACETAMINOPHEN 5; 325 MG/1; MG/1
1 TABLET ORAL EVERY 6 HOURS PRN
Qty: 21 TABLET | Refills: 0 | Status: SHIPPED | OUTPATIENT
Start: 2019-01-04

## 2019-01-04 RX ORDER — SUCCINYLCHOLINE CHLORIDE 20 MG/ML
INJECTION INTRAMUSCULAR; INTRAVENOUS
Status: DISCONTINUED | OUTPATIENT
Start: 2019-01-04 | End: 2019-01-04

## 2019-01-04 RX ORDER — EPHEDRINE SULFATE 50 MG/ML
INJECTION, SOLUTION INTRAVENOUS
Status: DISCONTINUED | OUTPATIENT
Start: 2019-01-04 | End: 2019-01-04

## 2019-01-04 RX ORDER — KETAMINE HCL IN 0.9 % NACL 50 MG/5 ML
SYRINGE (ML) INTRAVENOUS
Status: DISCONTINUED | OUTPATIENT
Start: 2019-01-04 | End: 2019-01-04

## 2019-01-04 RX ADMIN — LIDOCAINE HYDROCHLORIDE 2 MG: 10 INJECTION, SOLUTION EPIDURAL; INFILTRATION; INTRACAUDAL; PERINEURAL at 10:01

## 2019-01-04 RX ADMIN — MIDAZOLAM HYDROCHLORIDE 2 MG: 1 INJECTION, SOLUTION INTRAMUSCULAR; INTRAVENOUS at 11:01

## 2019-01-04 RX ADMIN — EPHEDRINE SULFATE 10 MG: 50 INJECTION, SOLUTION INTRAMUSCULAR; INTRAVENOUS; SUBCUTANEOUS at 12:01

## 2019-01-04 RX ADMIN — ONDANSETRON 4 MG: 2 INJECTION INTRAMUSCULAR; INTRAVENOUS at 12:01

## 2019-01-04 RX ADMIN — SODIUM CHLORIDE: 0.9 INJECTION, SOLUTION INTRAVENOUS at 10:01

## 2019-01-04 RX ADMIN — FENTANYL CITRATE 100 MCG: 50 INJECTION, SOLUTION INTRAMUSCULAR; INTRAVENOUS at 11:01

## 2019-01-04 RX ADMIN — PROPOFOL 150 MG: 10 INJECTION, EMULSION INTRAVENOUS at 11:01

## 2019-01-04 RX ADMIN — SODIUM CHLORIDE: 0.9 INJECTION, SOLUTION INTRAVENOUS at 12:01

## 2019-01-04 RX ADMIN — Medication 10 MG: at 12:01

## 2019-01-04 RX ADMIN — SUCCINYLCHOLINE CHLORIDE 140 MG: 20 INJECTION, SOLUTION INTRAMUSCULAR; INTRAVENOUS at 11:01

## 2019-01-04 RX ADMIN — DEXAMETHASONE SODIUM PHOSPHATE 8 MG: 4 INJECTION, SOLUTION INTRAMUSCULAR; INTRAVENOUS at 12:01

## 2019-01-04 RX ADMIN — Medication 20 MG: at 12:01

## 2019-01-04 RX ADMIN — ACETAMINOPHEN 1000 MG: 10 INJECTION, SOLUTION INTRAVENOUS at 12:01

## 2019-01-04 RX ADMIN — LIDOCAINE HYDROCHLORIDE 60 MG: 20 INJECTION, SOLUTION INTRAVENOUS at 11:01

## 2019-01-04 NOTE — PLAN OF CARE
PT is AAOx4. VSS. NAD. IV discontinued. Discharge instructions given, verbalized understanding. Denies pain or nausea. DIscharged home with family.

## 2019-01-04 NOTE — PLAN OF CARE
POC reviewed with pt, acknowledged understanding. Pt AAO x 4. Pt remains free of falls/injuries. Pt on telemetry on SR. BG checked upon arrival to the unit. Pt tolerating clear liquid diet. Pt pain denies pain. Pt deaf in L ear. R ear packed w/ cotton balls. No acute events throughout shift. No distress noted, will continue to monitor.

## 2019-01-04 NOTE — DISCHARGE INSTRUCTIONS
Tympanostomy (Ear Tube)    Tympanostomy is a simple surgical procedure that places a tiny tube into the eardrum. The tube drains fluid buildup and balances air pressure on both sides of the eardrum.  Before the procedure  · Unless youre told otherwise, stop giving your child food and drink at least 4 hours before the scheduled arrival time. Verify the exact time with the surgeon's office.  · Your child will have a physical exam, including taking his or her temperature to rule out any active infection. This could require postponing surgery.  · When you arrive, your child may be given medicine (a mild sedative) to help him or her relax.  · You--as parent or legal guardian--will be given a consent form to sign after the healthcare provider has discussed the procedure with you.  During the procedure  · Using an operating microscope and special surgical instruments, the surgeon will make a small slit in the eardrum (tympanotomy).  · The surgeon will use a suction tube to gently remove fluid buildup through the slit in the eardrum. In some cases, a fluid sample may be sent to a lab to see if the infection is still active.  · The surgeon will put a tiny tube into the same slit in the eardrum (tympanostomy). Once in position, the shape of the tube helps keep it in place. Tubes can be made of plastic or metal, and they vary slightly in size and shape.  After the procedure  · Within a half-hour, your child will wake up. When you join your child, dont be alarmed if he or she is upset. Anesthesia may reduce self-control. This causes some children to cry or scream.  · Once your child is calm enough to sit up and drink fluids, he or she can go home.  · At home, be sure to give your child any eardrops or other medicine as directed by the healthcare provider.  · Go to all follow-up appointments as scheduled.  When to call your child's healthcare provider  Call your healthcare provider if your otherwise healthy child has any of  the signs or symptoms described below:  · The ear bleeds heavily or keeps bleeding after the first 48 hours.  · Sticky or discolored fluid drains out of the ear after the first 48 hours.  · Fever (see Fever and children, below)  · Your child has had a seizure caused by the fever  · You child is dizzy, confused, extremely drowsy, or has a change in mental state.  Fever and children  Always use a digital thermometer to check your childs temperature. Never use a mercury thermometer.  For infants and toddlers, be sure to use a rectal thermometer correctly. A rectal thermometer may accidentally poke a hole in (perforate) the rectum. It may also pass on germs from the stool. Always follow the product makers directions for proper use. If you dont feel comfortable taking a rectal temperature, use another method. When you talk to your childs healthcare provider, tell him or her which method you used to take your childs temperature.  Here are guidelines for fever temperature. Ear temperatures arent accurate before 6 months of age. Dont take an oral temperature until your child is at least 4 years old.  Infant under 3 months old:  · Ask your childs healthcare provider how you should take the temperature.  · Rectal or forehead (temporal artery) temperature of 100.4°F (38°C) or higher, or as directed by the provider  · Armpit temperature of 99°F (37.2°C) or higher, or as directed by the provider  Child age 3 to 36 months:  · Rectal, forehead (temporal artery), or ear temperature of 102°F (38.9°C) or higher, or as directed by the provider  · Armpit temperature of 101°F (38.3°C) or higher, or as directed by the provider  Child of any age:  · Repeated temperature of 104°F (40°C) or higher, or as directed by the provider  · Fever that lasts more than 24 hours in a child under 2 years old. Or a fever that lasts for 3 days in a child 2 years or older.       Incision Care  Remember: Follow-up visits allow your healthcare  provider to make sure your incision is healing well. Be sure to keep your appointments.     Stitches (sutures), surgical staples, special strips of surgical tape, or surgical skin glue may be used to close incisions. They also help stop bleeding and speed healing. To help your incision heal, follow the tips on this handout.  Home care  Tips for home care include the following:  · Always wash your hands before touching your incision.  · Keep your incision clean and dry.  · Avoid doing things that could cause dirt or sweat to get on your incision.  · Dont pick at scabs. They help protect the wound.  · Keep your incision out of water.  · Take a sponge bath to avoid getting your incision wet, unless your healthcare provider tells you otherwise.  · Ask your provider when can you take a shower or bathe.  · Ask your provider about the best way to keep your incision dry when bathing or showering.  · Pat stitches dry if they get wet. Dont rub.  · Leave the bandage (dressing) in place until you are told to remove it or change it. Change it only as directed, using clean hands.  · After the first 12 hours, change your dressing every 24 hours, or as directed by your healthcare provider.  · Change your dressing if it gets wet or soiled.  Care for specific closures  Follow these guidelines unless your healthcare provider tells you otherwise:  · Stitches or staples. Once you no longer need to keep these dry, clean the wound daily. First remove the bandage using clean hands. Then wash the area gently with soap and warm water. Use a wet cotton swab to loosen and remove any blood or crust that forms. After cleaning, put a thin layer of antibiotic ointment on. Then put on a new bandage.  · Skin glue. Dont put liquid, ointment, or cream on your wound while the glue is in place. Avoid activities that cause heavy sweating. Protect the wound from sunlight. Do not scratch, rub, or pick at the glue. Do not put tape directly over the  glue. The glue should peel off within 5 to 10 days.  · Surgical tape. Keep the area dry. If it gets wet, blot the area dry with a clean towel. Surgical tape usually falls off within 7 to 10 days. If it has not fallen off after 10 days, contact your healthcare provider before taking it off yourself. If you are told to remove the tape, put mineral oil or petroleum jelly on a cotton ball. Gently rub the tape until it is removed.  Changing your dressing  Leave the dressing (bandage) in place until you are told to remove it or change it. Follow the instructions below unless told otherwise by your healthcare provider:  · Always wash your hands before changing your dressing.  · After the first 48 hours, the incision wound usually will have closed. At this point, leave the incision uncovered and open to the air. If the incision has not closed keep it covered.  · Cover your incision only if your clothing is rubbing it or causing irritation.  · Change your dressing if it gets wet or soiled.  Follow-up care  Follow up with your healthcare provider to ask how long sutures or staples should be left in place. Be sure to return for stitch or staple removal as directed. If dissolving stitches were used in your mouth, these will not need to be removed. They should fall out or dissolve on their own.  If tape closures were used, remove them yourself when your provider recommends if they have not fallen off on their own. If skin glue was used, the glue will wear off by itself.  When to seek medical care  Call your healthcare provider if you have any of the following:  · More pain, redness, swelling, bleeding, or foul-smelling discharge around the incision area  · Fever of 100.4°F (38ºC) or higher, or as directed by your healthcare provider  · Shaking chills  · Vomiting or nausea that doesn't go away  · Numbness, coldness, or tingling around the incision area, or changes in skin color  · Opening of the sutures or wound  · Stitches or  staples come apart or fall out or surgical tape falls off before 7 days or as directed by your healthcare provider

## 2019-01-04 NOTE — BRIEF OP NOTE
[facility]  Brief Operative Note     SUMMARY     Surgery Date: 1/4/2019     Surgeon(s) and Role:     * Fredy Hernandez MD - Primary     * Jacob Patrick Brunner, MD - Resident - Assisting        Pre-op Diagnosis:  Mixed conductive and sensorineural hearing loss of left ear with restricted hearing of right ear [H90.A32]  Polyp of ear canal, right [H61.891]    Post-op Diagnosis:  Post-Op Diagnosis Codes:     * Mixed conductive and sensorineural hearing loss of left ear with restricted hearing of right ear [H90.A32]     * Polyp of ear canal, right [H61.891]    Procedure(s) (LRB):  EXCISION, LESION, EAR - Polyp (Right)  MYRINGOTOMY, WITH TYMPANOSTOMY TUBE INSERTION (Right)    Anesthesia: * No anesthesia type entered *    Description of the findings of the procedure: right PE tube placement and removal of polyp    Findings/Key Components: see operative note    Estimated Blood Loss: * No values recorded between 1/4/2019 12:09 PM and 1/4/2019 12:58 PM *         Specimens:   Specimen (12h ago, onward)    Start     Ordered    Pending  Specimen to Pathology - Surgery  Once     Comments:  1. Right Ear Canal Polyp - permanent      Pending          Discharge Note    SUMMARY     Admit Date: 1/4/2019    Discharge Date and Time:  01/04/2019 12:58 PM    Hospital Course (synopsis of major diagnoses, care, treatment, and services provided during the course of the hospital stay): POD0 s/p planned elective procedure, no apparent complications, discharge to home with prompt follow up.       Final Diagnosis: Post-Op Diagnosis Codes:     * Mixed conductive and sensorineural hearing loss of left ear with restricted hearing of right ear [H90.A32]     * Polyp of ear canal, right [H61.891]    Disposition: Home or Self Care    Follow Up/Patient Instructions:     Medications:  Reconciled Home Medications:      Medication List      START taking these medications    ciprofloxacin-dexamethasone 0.3-0.1% 0.3-0.1 % Drps  Commonly  known as:  CIPRODEX  Place 4 drops into the right ear 2 (two) times daily.     HYDROcodone-acetaminophen 5-325 mg per tablet  Commonly known as:  NORCO  Take 1 tablet by mouth every 6 (six) hours as needed for Pain.        CONTINUE taking these medications    acetaminophen 325 MG tablet  Commonly known as:  TYLENOL  Take 650 mg by mouth every 4 (four) hours as needed for Pain.     aspirin 81 MG Chew  Take 81 mg by mouth every morning.     bevacizumab 2.5 mg/0.1 mL Syrg  by Intravitreal route every 30 days.     bumetanide 2 MG tablet  Commonly known as:  BUMEX  Take 2 mg by mouth 2 (two) times daily.     CALCIUM 500 + D 500 mg(1,250mg) -200 unit per tablet  Generic drug:  calcium-vitamin D3  Take 1 tablet by mouth 2 (two) times daily with meals.     clopidogrel 75 mg tablet  Commonly known as:  PLAVIX  Take 75 mg by mouth every morning.     dextran 70-hypromellose ophthalmic solution  Commonly known as:  TEARS  Place 1 drop into both eyes 4 (four) times daily.     DRISDOL 50,000 unit Cap  Generic drug:  ergocalciferol  Take 50,000 Units by mouth every 7 days. Takes on Thursdays.     DUREZOL 0.05 % Drop ophthalmic solution  Generic drug:  difluprednate  1 drop. One drop into left eye once a day.  One drip into right eye three times a day     ferrous sulfate 324 mg (65 mg iron) Tbec  Take 325 mg by mouth 3 (three) times daily.     fluticasone 50 mcg/actuation nasal spray  Commonly known as:  FLONASE  1 spray by Each Nare route nightly.     gabapentin 100 MG capsule  Commonly known as:  NEURONTIN  Take 100 mg by mouth 3 (three) times daily.     levothyroxine 175 MCG tablet  Commonly known as:  SYNTHROID, LEVOTHROID  Take 175 mcg by mouth every morning.     loratadine 10 mg tablet  Commonly known as:  CLARITIN  Take 10 mg by mouth every morning.     metOLazone 2.5 MG tablet  Commonly known as:  ZAROXOLYN  Take 2.5 mg by mouth. One tablet on Mondays and Thursdays when weight is over 159 LBS.     metoprolol succinate 25 MG  24 hr tablet  Commonly known as:  TOPROL-XL  Take 25 mg by mouth every morning.     NovoLOG U-100 Insulin aspart 100 unit/mL injection  Generic drug:  insulin aspart U-100  Inject into the skin 3 (three) times daily before meals. Sliding scale     pantoprazole 40 MG tablet  Commonly known as:  PROTONIX  Take 40 mg by mouth every morning.     polyethylene glycol 17 gram Pwpk  Commonly known as:  GLYCOLAX  Take by mouth every morning.     PROCRIT 40,000 unit/mL injection  Generic drug:  epoetin jose  Inject 40,000 Units into the skin. Weekly prn     ranolazine 500 MG Tb12  Commonly known as:  RANEXA  Take 500 mg by mouth 2 (two) times daily.     rosuvastatin 20 MG tablet  Commonly known as:  CRESTOR  Take 20 mg by mouth nightly.     sevelamer carbonate 800 mg Tab  Commonly known as:  RENVELA  Take 800 mg by mouth 4 (four) times daily.     SURFAK 240 mg capsule  Generic drug:  docusate calcium  Take 240 mg by mouth every morning.     traMADol 50 mg tablet  Commonly known as:  ULTRAM  Take 50 mg by mouth every 8 (eight) hours as needed for Pain.     traZODone 50 MG tablet  Commonly known as:  DESYREL  Take 50 mg by mouth nightly.     valsartan 320 MG tablet  Commonly known as:  DIOVAN  Take 320 mg by mouth every morning.          Discharge Procedure Orders   Notify your health care provider if you experience any of the following:  difficulty breathing or increased cough     No dressing needed   Scheduling Instructions: Dry ear precautions, do not get ear wet    Use ciprodex drops as directed. Ok to place cotton ball over ear canal if draining     Activity as tolerated

## 2019-01-04 NOTE — TRANSFER OF CARE
"Anesthesia Transfer of Care Note    Patient: Luci Farias    Procedure(s) Performed: Procedure(s) (LRB):  EXCISION, LESION, EAR - Polyp (Right)  MYRINGOTOMY, WITH TYMPANOSTOMY TUBE INSERTION (Right)    Patient location: PACU    Anesthesia Type: general    Transport from OR: Transported from OR on 6-10 L/min O2 by face mask with adequate spontaneous ventilation    Post pain: adequate analgesia    Post assessment: no apparent anesthetic complications    Post vital signs: stable    Level of consciousness: awake and responds to stimulation    Nausea/Vomiting: no nausea/vomiting    Complications: none    Transfer of care protocol was followed      Last vitals:   Visit Vitals  BP (!) 180/86 (BP Location: Right arm, Patient Position: Lying)   Pulse 81   Temp 36.6 °C (97.8 °F) (Oral)   Resp 16   Ht 5' 4" (1.626 m)   Wt 68 kg (150 lb)   SpO2 100%   Breastfeeding? No   BMI 25.75 kg/m²     "

## 2019-01-04 NOTE — OP NOTE
[facility]  Surgery Department  Operative Note    SUMMARY     Date of Procedure: 1/4/2019     Procedure: Procedure(s) (LRB):  EXCISION, LESION, EAR - Polyp (Right)  MYRINGOTOMY, WITH TYMPANOSTOMY TUBE INSERTION (Right)     Surgeon(s) and Role:     * Fredy Hernandez MD - Primary     * Jacob Patrick Brunner, MD - Resident - Assisting        Pre-Operative Diagnosis: Mixed conductive and sensorineural hearing loss of left ear with restricted hearing of right ear [H90.A32]  Polyp of ear canal, right [H61.891]    Post-Operative Diagnosis: Post-Op Diagnosis Codes:     * Mixed conductive and sensorineural hearing loss of left ear with restricted hearing of right ear [H90.A32]     * Polyp of ear canal, right [H61.891]    Anesthesia: General      Description of the Findings of the Procedure: multiple polyps of medial EAC, removed.  TM intact with mucoid effusion. PE tube placed    Significant Surgical Tasks Conducted by the Assistant(s), if Applicable: N/A    Complications: No    Indications: Luci Farias is a 68 y.o. female with history of bilateral hearing loss worse on left side.  Developed chronic otitis media of right ear with polyp formation within the ear canal.  Patient did not respond to outpatient medical treatment and debridement in clinic, and it was recommended for patient undergo evaluation under anesthesia for removal of polyps and possible pressure equalization tube palcement    Procedure in Detail:        The patient was taken to the operating room, placed under general anesthetic and intubated without difficulty.     We prepped and draped the ear in a sterile fashion.     The operating microscope was then brought onto the field and the tympanic membrane was visualized.  There were multiple polyps of the medial external auditory canal, all located adjacent to the annulus.  The largest and only one of significant size was located anterior superiorly and was blocking the upper portion of  the tympanic membrane.  the tympanic membrane appeared intact and there was an obvious middle ear effusion.  . A myringotomy incision was made and the effusion evacuated. It was mucoid in nature.  A sterile Urbano V tympanostomy tube was placed into the incision after middle ear was suctioned.    The largest polyp was then removed using forceps and sent for pathology.  Silver nitrate was used to control the bleeding.      Finally ofloxacin drops were placed into the ear canal and a cotton ball was placed in the ear canal.     Water precautions discussed.    Estimated Blood Loss (EBL): 3ml  Implants:   Implant Name Type Inv. Item Serial No.  Lot No. LRB No. Used   TUBE VENT FLUORO 1.14M - [id number]  TUBE VENT FLUORO 1.14M  OLYMPUS DAVID JAKE [id number] Right 1       Specimens:   Specimen (12h ago, onward)    Start     Ordered    01/04/19 1315  Specimen to Pathology - Surgery  Once     Comments:  1. Right Ear Canal Polyp - permanent     Start Status   01/04/19 1315 Collected (01/04/19 1245)       01/04/19 1315                  Condition: Good    Disposition: PACU - hemodynamically stable.    Attestation: I was present and scrubbed for the entire procedure.

## 2019-01-09 NOTE — ANESTHESIA POSTPROCEDURE EVALUATION
"Anesthesia Post Evaluation    Patient: Luci Farias    Procedure(s) Performed: Procedure(s) (LRB):  EXCISION, LESION, EAR - Polyp (Right)  MYRINGOTOMY, WITH TYMPANOSTOMY TUBE INSERTION (Right)    Final Anesthesia Type: general  Patient location during evaluation: PACU  Patient participation: Yes- Able to Participate  Level of consciousness: awake and alert and oriented  Post-procedure vital signs: reviewed and stable  Pain management: adequate  Airway patency: patent  PONV status at discharge: No PONV  Anesthetic complications: no      Cardiovascular status: blood pressure returned to baseline  Respiratory status: unassisted  Hydration status: euvolemic  Follow-up not needed.        Visit Vitals  BP (!) 144/64   Pulse 77   Temp 36.8 °C (98.2 °F) (Temporal)   Resp 17   Ht 5' 4" (1.626 m)   Wt 68 kg (150 lb)   SpO2 100%   Breastfeeding? No   BMI 25.75 kg/m²       Pain/Rosario Score: No Data Recorded      "

## 2019-01-17 ENCOUNTER — OFFICE VISIT (OUTPATIENT)
Dept: OTOLARYNGOLOGY | Facility: CLINIC | Age: 69
End: 2019-01-17
Payer: MEDICARE

## 2019-01-17 VITALS
WEIGHT: 151.25 LBS | SYSTOLIC BLOOD PRESSURE: 131 MMHG | DIASTOLIC BLOOD PRESSURE: 75 MMHG | BODY MASS INDEX: 25.96 KG/M2 | TEMPERATURE: 97 F | HEART RATE: 87 BPM

## 2019-01-17 DIAGNOSIS — H90.A32 MIXED CONDUCTIVE AND SENSORINEURAL HEARING LOSS OF LEFT EAR WITH RESTRICTED HEARING OF RIGHT EAR: ICD-10-CM

## 2019-01-17 DIAGNOSIS — H65.491 CHRONIC NONSUPPURATIVE OTITIS MEDIA, RIGHT: ICD-10-CM

## 2019-01-17 DIAGNOSIS — H61.891 POLYP OF EAR CANAL, RIGHT: Primary | ICD-10-CM

## 2019-01-17 PROCEDURE — 99999 PR PBB SHADOW E&M-EST. PATIENT-LVL III: CPT | Mod: PBBFAC,,, | Performed by: OTOLARYNGOLOGY

## 2019-01-17 PROCEDURE — 99024 PR POST-OP FOLLOW-UP VISIT: ICD-10-PCS | Mod: S$GLB,,, | Performed by: OTOLARYNGOLOGY

## 2019-01-17 PROCEDURE — 99024 POSTOP FOLLOW-UP VISIT: CPT | Mod: S$GLB,,, | Performed by: OTOLARYNGOLOGY

## 2019-01-17 PROCEDURE — 99999 PR PBB SHADOW E&M-EST. PATIENT-LVL III: ICD-10-PCS | Mod: PBBFAC,,, | Performed by: OTOLARYNGOLOGY

## 2019-01-18 NOTE — PROGRESS NOTES
Doing well, able to converse without hearing aid and hear ok    Exam    PE tube in place, slight moisture around tube, no evidence of polyps    Follow up in 4 months

## 2019-01-25 ENCOUNTER — TELEPHONE (OUTPATIENT)
Dept: OTOLARYNGOLOGY | Facility: CLINIC | Age: 69
End: 2019-01-25

## 2019-01-25 NOTE — TELEPHONE ENCOUNTER
----- Message from Ev Ventura sent at 1/25/2019 12:56 PM CST -----  Contact: pt  Pt stated she's calling about hearing aid, and she can be reached at [phone] Thanks

## 2019-01-28 ENCOUNTER — TELEPHONE (OUTPATIENT)
Dept: AUDIOLOGY | Facility: CLINIC | Age: 69
End: 2019-01-28

## 2019-01-28 NOTE — TELEPHONE ENCOUNTER
----- Message from Deanna Mosley sent at 1/25/2019  2:14 PM CST -----  Contact: Kailee, Patients nurse  Ms Kailee needs to know if patient is suppose to have a follow up appt from her surgery on 1/4/19, please call her back at  [phone]. Thank you    Left VM with [facility] phone # /rtoma/

## 2019-02-07 ENCOUNTER — CLINICAL SUPPORT (OUTPATIENT)
Dept: AUDIOLOGY | Facility: CLINIC | Age: 69
End: 2019-02-07
Payer: MEDICARE

## 2019-02-07 DIAGNOSIS — Z46.1 HEARING AID FITTING OR ADJUSTMENT: Primary | ICD-10-CM

## 2019-02-07 DIAGNOSIS — H90.3 HEARING LOSS, SENSORINEURAL, ASYMMETRICAL: Primary | ICD-10-CM

## 2019-02-07 PROCEDURE — 92557 PR COMPREHENSIVE HEARING TEST: ICD-10-PCS | Mod: 52,S$GLB,, | Performed by: AUDIOLOGIST

## 2019-02-07 PROCEDURE — 92557 COMPREHENSIVE HEARING TEST: CPT | Mod: 52,S$GLB,, | Performed by: AUDIOLOGIST

## 2019-02-07 PROCEDURE — 92567 PR TYMPA2METRY: ICD-10-PCS | Mod: S$GLB,,, | Performed by: AUDIOLOGIST

## 2019-02-07 PROCEDURE — 92567 TYMPANOMETRY: CPT | Mod: S$GLB,,, | Performed by: AUDIOLOGIST

## 2019-02-07 NOTE — PROGRESS NOTES
Luci Farias was seen 02/07/2019 for a hearing aid follow-up appointment.  Speaker dead. Replaced 2R power speaker and re prescribed settings for today's audiogram. I will have the dead speaker replaced.     I am still holding her  2R 85 speaker for when her hearing approves.      Patient will call for any future hearing aid follow-up appointments as needed.

## 2019-02-07 NOTE — PROGRESS NOTES
Luci Farias was seen 02/07/2019 for an audiological evaluation.  Patient had recent surgery with Dr. Hernandez for right polyp removal and PE tube placement. She reports hearing very well after the surgery up until a few weeks ago. Now she is not hearing as well.    Results reveal a moderate-to-profound mixed hearing loss 250-8000 Hz for the right ear,ear.   Speech Reception Thresholds were  50 dBHL for the right ear.  Word recognition scores were excellent for the right ear.  Tympanograms were Type B, abnormal, PE tube for the right ear and Type A, normal for the left ear.    Patient was counseled on the above findings. Significant improvement noted in hearing since prior to her recent surgery, but she reports that she was hearing much better than this a few weeks ago. She would like to follow-up with Dr. Hernandez sooner than her original 4 month recheck.    Recommendations include:    1.  ENT followup with Dr. Hernandez in 2 weeks   2.  Hearing aid follow-up today  3.  Wear hearing protective devices around loud noise  4.  Annual audiograms        2

## 2019-02-12 ENCOUNTER — TELEPHONE (OUTPATIENT)
Dept: AUDIOLOGY | Facility: CLINIC | Age: 69
End: 2019-02-12

## 2019-02-12 NOTE — TELEPHONE ENCOUNTER
Called Laurie, but was told she was gone for the day and to call back tomorrow between 6 and 2. I was told that she is the only one who schedules appointments. I will try to reach Ms. Dinora on her cell phone now.

## 2019-02-12 NOTE — TELEPHONE ENCOUNTER
----- Message from Kadi Moss sent at 2/12/2019 11:44 AM CST -----  Contact: wilbert ward  Type:  Sooner Apoointment Request    Caller is requesting a sooner appointment.  Caller declined first available appointment listed below.  Caller will not accept being placed on the waitlist and is requesting a message be sent to doctor.  Name of Caller:Laurie  When is the first available appointment?n/a  Symptoms:hearing aid is buzzing really loud  Would the patient rather a call back or a response via [employer]? Call back  Best Call Back Number:[phone]  Additional Information: none    Thanks,  Kadi Moss

## 2019-02-14 ENCOUNTER — CLINICAL SUPPORT (OUTPATIENT)
Dept: AUDIOLOGY | Facility: CLINIC | Age: 69
End: 2019-02-14
Payer: MEDICARE

## 2019-02-14 DIAGNOSIS — Z46.1 HEARING AID FITTING OR ADJUSTMENT: Primary | ICD-10-CM

## 2019-02-14 NOTE — PROGRESS NOTES
Luci Farias was seen 02/14/2019 for a hearing aid follow-up appointment.  The 6 mm power dome was causing feedback. She will try an 8 mm power dome now. She did great with insertion.    Her appointment with Dr. Hernandez that I scheduled for her next week was rescheduled by someone in scheduling. She did not call, but thinks someone in her nursing home facility did. It was rescheduled for Dr. Alvarado and Misty Colbert in March. Luckily, Dr. Hernandez still has one opening for 10:30 next Thursday and I was able to get her scheduled again.     She needs to see him to have her tube checked. Her tympanogram resulted in a normal ear canal volume and she is not hearing as good as she did following the surgery.       Patient will call for any future hearing aid follow-up appointments as needed.

## 2019-02-21 ENCOUNTER — OFFICE VISIT (OUTPATIENT)
Dept: OTOLARYNGOLOGY | Facility: CLINIC | Age: 69
End: 2019-02-21
Payer: MEDICARE

## 2019-02-21 VITALS
DIASTOLIC BLOOD PRESSURE: 79 MMHG | SYSTOLIC BLOOD PRESSURE: 137 MMHG | TEMPERATURE: 98 F | WEIGHT: 152.31 LBS | HEART RATE: 95 BPM | BODY MASS INDEX: 26.15 KG/M2

## 2019-02-21 DIAGNOSIS — H66.91 RIGHT OTITIS MEDIA, UNSPECIFIED OTITIS MEDIA TYPE: Primary | ICD-10-CM

## 2019-02-21 PROCEDURE — 99024 PR POST-OP FOLLOW-UP VISIT: ICD-10-PCS | Mod: S$GLB,,, | Performed by: OTOLARYNGOLOGY

## 2019-02-21 PROCEDURE — 99999 PR PBB SHADOW E&M-EST. PATIENT-LVL IV: CPT | Mod: PBBFAC,,, | Performed by: OTOLARYNGOLOGY

## 2019-02-21 PROCEDURE — 99024 POSTOP FOLLOW-UP VISIT: CPT | Mod: S$GLB,,, | Performed by: OTOLARYNGOLOGY

## 2019-02-21 PROCEDURE — 99999 PR PBB SHADOW E&M-EST. PATIENT-LVL IV: ICD-10-PCS | Mod: PBBFAC,,, | Performed by: OTOLARYNGOLOGY

## 2019-02-21 RX ORDER — MEMANTINE HYDROCHLORIDE 10 MG/1
5 TABLET ORAL 2 TIMES DAILY
COMMUNITY
End: 2019-02-21

## 2019-02-21 RX ORDER — PANTOPRAZOLE SODIUM 40 MG/1
40 TABLET, DELAYED RELEASE ORAL DAILY
COMMUNITY

## 2019-02-21 NOTE — PROGRESS NOTES
Post op PE tube placement and aural poly removal    Has noticed a decrease in hearing over the last few weeks    Exam:  AS: PE tube in place and patent, there is a middle ear effusion.  There is mucopurulent drainage in the ear canal.      Plan    Ciprodex gtt BID  Follow up in 2-3 weeks  If continued recurrence may need to consider BAHA as an option

## 2019-04-04 ENCOUNTER — CLINICAL SUPPORT (OUTPATIENT)
Dept: AUDIOLOGY | Facility: CLINIC | Age: 69
End: 2019-04-04
Payer: MEDICARE

## 2019-04-04 ENCOUNTER — OFFICE VISIT (OUTPATIENT)
Dept: OTOLARYNGOLOGY | Facility: CLINIC | Age: 69
End: 2019-04-04
Payer: MEDICARE

## 2019-04-04 VITALS
HEART RATE: 82 BPM | WEIGHT: 153.25 LBS | SYSTOLIC BLOOD PRESSURE: 155 MMHG | TEMPERATURE: 98 F | DIASTOLIC BLOOD PRESSURE: 84 MMHG | BODY MASS INDEX: 26.3 KG/M2

## 2019-04-04 DIAGNOSIS — H90.A32 MIXED CONDUCTIVE AND SENSORINEURAL HEARING LOSS OF LEFT EAR WITH RESTRICTED HEARING OF RIGHT EAR: Primary | ICD-10-CM

## 2019-04-04 DIAGNOSIS — H65.491 CHRONIC NONSUPPURATIVE OTITIS MEDIA, RIGHT: ICD-10-CM

## 2019-04-04 DIAGNOSIS — Z46.1 HEARING AID FITTING OR ADJUSTMENT: Primary | ICD-10-CM

## 2019-04-04 PROCEDURE — 99024 POSTOP FOLLOW-UP VISIT: CPT | Mod: S$GLB,,, | Performed by: OTOLARYNGOLOGY

## 2019-04-04 PROCEDURE — 99999 PR PBB SHADOW E&M-EST. PATIENT-LVL III: ICD-10-PCS | Mod: PBBFAC,,, | Performed by: OTOLARYNGOLOGY

## 2019-04-04 PROCEDURE — 99999 PR PBB SHADOW E&M-EST. PATIENT-LVL III: CPT | Mod: PBBFAC,,, | Performed by: OTOLARYNGOLOGY

## 2019-04-04 PROCEDURE — 99024 PR POST-OP FOLLOW-UP VISIT: ICD-10-PCS | Mod: S$GLB,,, | Performed by: OTOLARYNGOLOGY

## 2019-04-04 NOTE — PROGRESS NOTES
Adanmandisherri Farias was seen 04/04/2019 for a hearing aid follow-up appointment.  She is not able to wear the 8 mm power dome because it gets stuck in her canal often. She needs to go back to the 6 mm power. Dr. Hernandez removed debris from her EAC today. I put her in the rodriguez to determine if she has had any improvement in her hearing and there was slight improvement. Her thresholds were put into the computer and settings re-prescribed. Feedback analyzer had to be run also. She reported the aid sounded nice and clear. A card of 6 mm power domes was given to her.    I sent Patria with [employer] her audiogram and she says Ms. Farias could be a Super Power BAHA candidate. She will sent our clinic a demo to have her try.      I will call her as soon as I get the processor.

## 2019-04-05 NOTE — PROGRESS NOTES
Post op PE tube placement and aural poly removal    Here for follow up otorrhea, treated with ciprodex.  States doing better. Able to hear well with hearing aid, but has trouble with dome getting stuck    Exam:  AD: PE tube in place and patent, however there was greenish debris surrounding the tube and tympanic membrane, this was thoroughly cleaned.  There did not appear to be active drainage from middle ear, no evidence of cholesteatoma    Plan    Stop ciprodex  F/u with audiology  May consider BAHA super power trial if continued infection problems related to hearing aid

## 2019-05-16 ENCOUNTER — OFFICE VISIT (OUTPATIENT)
Dept: OTOLARYNGOLOGY | Facility: CLINIC | Age: 69
End: 2019-05-16
Payer: MEDICARE

## 2019-05-16 ENCOUNTER — CLINICAL SUPPORT (OUTPATIENT)
Dept: AUDIOLOGY | Facility: CLINIC | Age: 69
End: 2019-05-16
Payer: MEDICARE

## 2019-05-16 VITALS — WEIGHT: 157.63 LBS | BODY MASS INDEX: 27.06 KG/M2 | TEMPERATURE: 98 F

## 2019-05-16 DIAGNOSIS — Z46.1 HEARING AID FITTING OR ADJUSTMENT: Primary | ICD-10-CM

## 2019-05-16 DIAGNOSIS — H66.11 CHRONIC TUBOTYMPANIC SUPPURATIVE OTITIS MEDIA OF RIGHT EAR: Primary | ICD-10-CM

## 2019-05-16 PROCEDURE — 92504 EAR MICROSCOPY EXAMINATION: CPT | Mod: S$GLB,,, | Performed by: OTOLARYNGOLOGY

## 2019-05-16 PROCEDURE — 99999 PR PBB SHADOW E&M-EST. PATIENT-LVL III: CPT | Mod: PBBFAC,,, | Performed by: OTOLARYNGOLOGY

## 2019-05-16 PROCEDURE — 99213 OFFICE O/P EST LOW 20 MIN: CPT | Mod: S$GLB,,, | Performed by: OTOLARYNGOLOGY

## 2019-05-16 PROCEDURE — 99999 PR PBB SHADOW E&M-EST. PATIENT-LVL III: ICD-10-PCS | Mod: PBBFAC,,, | Performed by: OTOLARYNGOLOGY

## 2019-05-16 PROCEDURE — 92504 PR EAR MICROSCOPY EXAMINATION: ICD-10-PCS | Mod: S$GLB,,, | Performed by: OTOLARYNGOLOGY

## 2019-05-16 PROCEDURE — 99213 PR OFFICE/OUTPT VISIT, EST, LEVL III, 20-29 MIN: ICD-10-PCS | Mod: S$GLB,,, | Performed by: OTOLARYNGOLOGY

## 2019-05-16 RX ORDER — OFLOXACIN 3 MG/ML
4 SOLUTION/ DROPS OPHTHALMIC 2 TIMES DAILY
Qty: 10 ML | Refills: 3 | Status: CANCELLED | OUTPATIENT
Start: 2019-05-16

## 2019-05-16 NOTE — PROGRESS NOTES
Luci Farias was seen 05/16/2019 for a hearing aid follow-up appointment.  Right aid dead again. Speaker replaced and it is now in good working order. Otoscopy revealed a small amount of drainage around her tube. She will see Dr. Hernandez today to have it suctioned and discuss options to keep it dry, more drops or powder.     I will get the super power BAHA ready to simulate and call her next week to schedule.       Patient will call for any future hearing aid follow-up appointments as needed.

## 2019-05-17 NOTE — PROGRESS NOTES
Subjective:       Patient ID: Luci Farias is a 68 y.o. female.    Chief Complaint: Ear Drainage    Ear Drainage    Pertinent negatives include no sore throat.        67 yo with history of severe SNHL AU, with development of AD chronic otitis media in right ear s/p PE tube placement.  She reports she has drainage for the last week.  She denies associated pain or hearing change. Denies fevers.     Review of Systems   Constitutional: Negative for chills and fever.   HENT: Negative for sore throat and trouble swallowing.    Respiratory: Negative for apnea and chest tightness.    Cardiovascular: Negative for chest pain.         Objective:      Physical Exam   Constitutional: She appears well-developed and well-nourished.   HENT:   Head: Normocephalic and atraumatic.   Right Ear: External ear normal. There is swelling (aural polyp attached to anterior TM, some mucoid drainage medially in the canal.  ).   Left Ear: External ear normal. Tympanic membrane is scarred.   Nose: Nose normal.   Mouth/Throat: Uvula is midline, oropharynx is clear and moist and mucous membranes are normal.   Binocular microscopy:    The microscope, suction, and forceps were used to remove more of the EAC polyp, after removal area was cauterized further with silver nitrate x 3 sticks under microscopic guidance.     Neck: Normal range of motion. No thyroid mass present.       Data:              Assessment:       1. Chronic tubotympanic suppurative otitis media of right ear          Plan:   Ear cleaned today  Due to continued drainage recommend CT scan rule out any other contributing cause  Restart floxin drops   Follow up in 4-6wks

## 2019-05-23 ENCOUNTER — HOSPITAL ENCOUNTER (OUTPATIENT)
Dept: RADIOLOGY | Facility: HOSPITAL | Age: 69
Discharge: HOME | End: 2019-05-23
Attending: OTOLARYNGOLOGY
Payer: MEDICARE

## 2019-05-23 ENCOUNTER — TELEPHONE (OUTPATIENT)
Dept: OTOLARYNGOLOGY | Facility: CLINIC | Age: 69
End: 2019-05-23

## 2019-05-23 DIAGNOSIS — H66.11 CHRONIC TUBOTYMPANIC SUPPURATIVE OTITIS MEDIA OF RIGHT EAR: ICD-10-CM

## 2019-05-23 PROCEDURE — 70480 CT ORBIT/EAR/FOSSA W/O DYE: CPT | Mod: TC

## 2019-05-23 PROCEDURE — 70480 CT TEMPORAL BONE WITHOUT CONTRAST: ICD-10-PCS | Mod: 26,,, | Performed by: RADIOLOGY

## 2019-05-23 PROCEDURE — 70480 CT ORBIT/EAR/FOSSA W/O DYE: CPT | Mod: 26,,, | Performed by: RADIOLOGY

## 2019-05-23 NOTE — TELEPHONE ENCOUNTER
----- Message from Estefany Espinosa sent at 5/23/2019 11:10 AM CDT -----  Contact: pt   .Type:  Patient Returning Call    Who Called: pt  Who Left Message for Patient:   Does the patient know what this is regarding?:   Would the patient rather a call back or a response via [employer]?  Call back   Best Call Back Number: [phone]   Additional Information:

## 2019-06-04 ENCOUNTER — CLINICAL SUPPORT (OUTPATIENT)
Dept: AUDIOLOGY | Facility: CLINIC | Age: 69
End: 2019-06-04
Payer: MEDICARE

## 2019-06-04 DIAGNOSIS — Z71.89 HEARING AID CONSULTATION: Primary | ICD-10-CM

## 2019-06-04 NOTE — PROGRESS NOTES
Luci Farias was seen 06/04/2019 for a Bone Anchored Hearing Aid evaluation to determine candidacy. Patient is having trouble with her right ear. It is her only hearing ear. She has had recent surgey to remove a polyp and now has drainage often. She has worn a right hearing aid for the past 2 years. I have replaced her medium speaker with a power speaker since her decrease in hearing, but when her ear drains it causes the aid to stop working. Patient was tested in the sound rodriguez in the sound field using NBN or speech from the right speaker.    An aided audiogram with Super Power BAHA 5 demo was obtained. An aided audiogram with right hearing aid was also obtained.    Patient subjectively reported noticeable benefit with the BAHA demo for speech clarity, quality and lateralization.  She would like to proceed with the BAHA surgery pending medical clearance and insurance coverage.  The Audiological BAHA purchase agreement was discussed and fitting/programming fee agreed.    Patient will follow-up with Dr. Hernandez for review and consult.

## 2019-06-20 ENCOUNTER — CLINICAL SUPPORT (OUTPATIENT)
Dept: AUDIOLOGY | Facility: CLINIC | Age: 69
End: 2019-06-20
Payer: MEDICARE

## 2019-06-20 DIAGNOSIS — Z46.1 HEARING AID FITTING OR ADJUSTMENT: Primary | ICD-10-CM

## 2019-06-20 NOTE — PROGRESS NOTES
Laurenelenitalarisa HANNON Dinora was seen 06/20/2019 for a hearing aid follow-up appointment.  Her hearing aid was not working, but last night she replaced her filter and dome. The aid is in good working order now. She got a new Iphone and I paired it to her aid and added the [place].     Otoscopy revealed dried blood in right EAC and PE tube could not be visualized.       Patient will return in July for ha and Dr. Hernandez appt to discuss Super Power Baha.

## 2019-07-18 ENCOUNTER — OFFICE VISIT (OUTPATIENT)
Dept: OTOLARYNGOLOGY | Facility: CLINIC | Age: 69
End: 2019-07-18
Payer: MEDICARE

## 2019-07-18 ENCOUNTER — CLINICAL SUPPORT (OUTPATIENT)
Dept: AUDIOLOGY | Facility: CLINIC | Age: 69
End: 2019-07-18
Payer: MEDICARE

## 2019-07-18 ENCOUNTER — TELEPHONE (OUTPATIENT)
Dept: OTOLARYNGOLOGY | Facility: CLINIC | Age: 69
End: 2019-07-18

## 2019-07-18 VITALS — DIASTOLIC BLOOD PRESSURE: 75 MMHG | HEART RATE: 86 BPM | SYSTOLIC BLOOD PRESSURE: 152 MMHG | TEMPERATURE: 98 F

## 2019-07-18 DIAGNOSIS — Z46.1 HEARING AID FITTING OR ADJUSTMENT: Primary | ICD-10-CM

## 2019-07-18 DIAGNOSIS — H90.3 SENSORINEURAL HEARING LOSS (SNHL) OF BOTH EARS: ICD-10-CM

## 2019-07-18 DIAGNOSIS — H90.A32 MIXED CONDUCTIVE AND SENSORINEURAL HEARING LOSS OF LEFT EAR WITH RESTRICTED HEARING OF RIGHT EAR: ICD-10-CM

## 2019-07-18 DIAGNOSIS — H66.11 CHRONIC TUBOTYMPANIC SUPPURATIVE OTITIS MEDIA OF RIGHT EAR: Primary | ICD-10-CM

## 2019-07-18 PROCEDURE — 92504 PR EAR MICROSCOPY EXAMINATION: ICD-10-PCS | Mod: S$GLB,,, | Performed by: OTOLARYNGOLOGY

## 2019-07-18 PROCEDURE — 92504 EAR MICROSCOPY EXAMINATION: CPT | Mod: S$GLB,,, | Performed by: OTOLARYNGOLOGY

## 2019-07-18 PROCEDURE — 99214 OFFICE O/P EST MOD 30 MIN: CPT | Mod: S$GLB,,, | Performed by: OTOLARYNGOLOGY

## 2019-07-18 PROCEDURE — 99999 PR PBB SHADOW E&M-EST. PATIENT-LVL II: CPT | Mod: PBBFAC,,, | Performed by: OTOLARYNGOLOGY

## 2019-07-18 PROCEDURE — 99214 PR OFFICE/OUTPT VISIT, EST, LEVL IV, 30-39 MIN: ICD-10-PCS | Mod: S$GLB,,, | Performed by: OTOLARYNGOLOGY

## 2019-07-18 PROCEDURE — 99999 PR PBB SHADOW E&M-EST. PATIENT-LVL II: ICD-10-PCS | Mod: PBBFAC,,, | Performed by: OTOLARYNGOLOGY

## 2019-07-18 NOTE — TELEPHONE ENCOUNTER
----- Message from Martina Gee MA sent at 7/18/2019  1:38 PM CDT -----      ----- Message -----  From: Cecy Díaz  Sent: 7/18/2019   1:30 PM  To: SHANTE Yanez, Master Fredy Sullivan is calling in regards to getting office notes fax to [phone].    [phone]

## 2019-07-18 NOTE — PROGRESS NOTES
Adanmandisherri Farias was seen 07/18/2019 for a hearing aid follow-up appointment.  She is doing well with her hearing aid. Her ear is dry. She tells me that she is moving to [place] for August 15th. She will have her son find an Audiologist near his home that dispenses [employer] hearing aids. She will let me know if she needs anything in regards to the hearing aid. She could have a custom mold made for the power speaker or consider a different hearing aid that is not a RITE. For now she will keep it as is since her ear is finally dry. She will speak with Dr. Hernandez today regarding other surgical options and BAHA.

## 2019-07-18 NOTE — PROGRESS NOTES
Subjective:       Patient ID: Luci Farias is a 68 y.o. female.    Chief Complaint: Follow-up        67 yo with history of severe SNHL AU, with development of AD chronic otitis media in right ear s/p PE tube placement.. At last visit she was having some otorrhea.  This has now resolved and she notes her hearing feels better.  She has no active complaints currently.     Patient reports that she is moving to [place] next month to live with her son and his family.      Review of Systems   Constitutional: Negative for chills and fever.   HENT: Negative for sore throat and trouble swallowing.    Respiratory: Negative for apnea and chest tightness.    Cardiovascular: Negative for chest pain.         Objective:      Physical Exam   Constitutional: She appears well-developed and well-nourished.   HENT:   Head: Normocephalic and atraumatic.   Right Ear: External ear normal. There is swelling (PE tube has extruded, this was removed, EAC is dry, TM intact, there is some blunting of the TM anteriorly, no obvious attic defect or evidence of cholesteatoma  ).   Left Ear: External ear normal. Tympanic membrane is scarred.   Nose: Nose normal.   Mouth/Throat: Uvula is midline, oropharynx is clear and moist and mucous membranes are normal.   Binocular microscopy:    AS: EAC patent , TM intact  AD: EAC patent, extruded PE tube, this was removed, TM is intact, no clear effusion, anteriorly there is some blunting of the TM and canal wall.    Neck: Normal range of motion. No thyroid mass present.       Data:    5/23/2019 CT temporal bones image independently reviewed by me and show: opacification of middle ear and mastoid without bone erosion or obvious signs of bone erosion.            Assessment:       1. Chronic tubotympanic suppurative otitis media of right ear    2. Mixed conductive and sensorineural hearing loss of left ear with restricted hearing of right ear    3. Sensorineural hearing loss (SNHL) of both ears           Plan:   Patient much improved today than last visit,     PE tube has extruded and TM healed    Will continue monitoring at this point    May consider CI evaluation AS due to difficulties in only hearing ear, AD    [place] moving to [place] area, recommended establish care with neurotologist Homer Garsia MD

## 2019-12-04 NOTE — TELEPHONE ENCOUNTER
----- Message from Bhumi Brown sent at 6/29/2017 12:24 PM CDT -----  Contact: Kailee Roca  She needs afrin order with the [employer].  Call hr at [phone].                                        cabrera   Melolabial Interpolation Flap Division And Inset Text: Division and inset of the melolabial interpolation flap was performed to achieve optimal aesthetic result, restore normal anatomic appearance and avoid distortion of normal anatomy, expedite and facilitate wound healing, achieve optimal functional result and because linear closure either not possible or would produce suboptimal result. The patient was prepped and draped in the usual manner. The pedicle was infiltrated with local anesthesia. The pedicle was sectioned with a #15 blade. The pedicle was de-bulked and trimmed to match the shape of the defect. Hemostasis was achieved. The flap donor site and free margin of the flap were secured with deep buried sutures and the wound edges were re-approximated.

## (undated) DEVICE — KIT DRESS GLASSCK EAR ADLT ST

## (undated) DEVICE — CLOSURE SKIN STERI STRIP 1/2X4

## (undated) DEVICE — KIT EAR EAOFE

## (undated) DEVICE — BLADE MYRINGTMY 70130780

## (undated) DEVICE — BURR DIAMOND SM. ROUND

## (undated) DEVICE — SEE MEDLINE ITEM 154981

## (undated) DEVICE — BLADE SCALP OPTHL NDL TIP 3MM

## (undated) DEVICE — SEE MEDLINE ITEM 152622

## (undated) DEVICE — SUT 3/0 18IN COATED VICRYLP

## (undated) DEVICE — BLADE SCALP BEAVER 2.5MM ANG

## (undated) DEVICE — BLADE SURG CARBON STEEL SZ11

## (undated) DEVICE — PAD CUSTOM COTTON 2 X 2

## (undated) DEVICE — BLADE SCALP OPHTL RND TIP

## (undated) DEVICE — SOL IRR NACL .9% 3000ML

## (undated) DEVICE — SOL IRR WATER STRL 3000 ML

## (undated) DEVICE — SEE MEDLINE ITEM 146373

## (undated) DEVICE — BURR DIAMOND 4 MM ROUND

## (undated) DEVICE — SUT CTD VICRYL 3-0 PS-2 UND

## (undated) DEVICE — SEE MEDLINE ITEM 157128

## (undated) DEVICE — BIT DRILL TUBING

## (undated) DEVICE — ELECTRODE REM PLYHSV RETURN 9

## (undated) DEVICE — SUT BONE WAX 2.5 GRMS 12/BX

## (undated) DEVICE — CLIPPER BLADE MOD 4406 (CAREF)

## (undated) DEVICE — SUCTION SURGICAL FRAZR

## (undated) DEVICE — DRAPE SURGICAL STERI IRRG PCH

## (undated) DEVICE — BLADE OTOLOGY BEAVER 5X84MM

## (undated) DEVICE — KIT SUCTION IRRIGATION

## (undated) DEVICE — DRAPE STERI 32 X 50